# Patient Record
Sex: FEMALE | Race: BLACK OR AFRICAN AMERICAN | Employment: UNEMPLOYED | URBAN - METROPOLITAN AREA
[De-identification: names, ages, dates, MRNs, and addresses within clinical notes are randomized per-mention and may not be internally consistent; named-entity substitution may affect disease eponyms.]

---

## 2019-12-17 ENCOUNTER — APPOINTMENT (OUTPATIENT)
Dept: CT IMAGING | Age: 39
End: 2019-12-17
Payer: COMMERCIAL

## 2019-12-17 ENCOUNTER — HOSPITAL ENCOUNTER (EMERGENCY)
Age: 39
Discharge: HOME | End: 2019-12-17
Attending: EMERGENCY MEDICINE
Payer: COMMERCIAL

## 2019-12-17 ENCOUNTER — APPOINTMENT (OUTPATIENT)
Dept: GENERAL RADIOLOGY | Age: 39
End: 2019-12-17
Payer: COMMERCIAL

## 2019-12-17 VITALS
DIASTOLIC BLOOD PRESSURE: 68 MMHG | TEMPERATURE: 98.1 F | RESPIRATION RATE: 12 BRPM | HEART RATE: 74 BPM | OXYGEN SATURATION: 96 % | WEIGHT: 126 LBS | HEIGHT: 65 IN | BODY MASS INDEX: 20.99 KG/M2 | SYSTOLIC BLOOD PRESSURE: 114 MMHG
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
## 2019-12-17 DIAGNOSIS — S09.90XA CLOSED HEAD INJURY, INITIAL ENCOUNTER: ICD-10-CM

## 2019-12-17 DIAGNOSIS — S60.052A CONTUSION OF LEFT LITTLE FINGER WITHOUT DAMAGE TO NAIL, INITIAL ENCOUNTER: ICD-10-CM

## 2019-12-17 DIAGNOSIS — S16.1XXA STRAIN OF NECK MUSCLE, INITIAL ENCOUNTER: ICD-10-CM

## 2019-12-17 LAB — HCG(URINE) PREGNANCY TEST: NEGATIVE

## 2019-12-17 PROCEDURE — 6370000000 HC RX 637 (ALT 250 FOR IP): Performed by: EMERGENCY MEDICINE

## 2019-12-17 PROCEDURE — 73140 X-RAY EXAM OF FINGER(S): CPT

## 2019-12-17 PROCEDURE — 72125 CT NECK SPINE W/O DYE: CPT

## 2019-12-17 PROCEDURE — 84703 CHORIONIC GONADOTROPIN ASSAY: CPT

## 2019-12-17 PROCEDURE — 99284 EMERGENCY DEPT VISIT MOD MDM: CPT

## 2019-12-17 RX ORDER — NAPROXEN 500 MG/1
500 TABLET ORAL 2 TIMES DAILY
Qty: 20 TABLET | Refills: 0 | Status: SHIPPED | OUTPATIENT
Start: 2019-12-17 | End: 2019-12-27

## 2019-12-17 RX ORDER — METHADONE HYDROCHLORIDE 10 MG/5ML
35 SOLUTION ORAL DAILY
COMMUNITY

## 2019-12-17 RX ORDER — IBUPROFEN 400 MG/1
400 TABLET ORAL ONCE
Status: COMPLETED | OUTPATIENT
Start: 2019-12-17 | End: 2019-12-17

## 2019-12-17 RX ADMIN — IBUPROFEN 400 MG: 400 TABLET, FILM COATED ORAL at 20:19

## 2019-12-17 SDOH — HEALTH STABILITY: MENTAL HEALTH: HOW OFTEN DO YOU HAVE A DRINK CONTAINING ALCOHOL?: NEVER

## 2019-12-17 ASSESSMENT — PAIN SCALES - GENERAL
PAINLEVEL_OUTOF10: 0
PAINLEVEL_OUTOF10: 7
PAINLEVEL_OUTOF10: 7

## 2019-12-17 ASSESSMENT — PAIN DESCRIPTION - LOCATION: LOCATION: NECK

## 2019-12-17 ASSESSMENT — PAIN DESCRIPTION - PAIN TYPE: TYPE: ACUTE PAIN

## 2019-12-17 ASSESSMENT — PAIN DESCRIPTION - ORIENTATION: ORIENTATION: POSTERIOR

## 2019-12-17 ASSESSMENT — PAIN DESCRIPTION - DESCRIPTORS: DESCRIPTORS: SHARP

## 2019-12-17 ASSESSMENT — PAIN DESCRIPTION - FREQUENCY: FREQUENCY: CONTINUOUS

## 2024-08-25 ENCOUNTER — HOSPITAL ENCOUNTER (EMERGENCY)
Age: 44
Discharge: HOME | End: 2024-08-25

## 2024-11-22 ENCOUNTER — APPOINTMENT (OUTPATIENT)
Dept: CT IMAGING | Age: 44
DRG: 309 | End: 2024-11-22
Payer: COMMERCIAL

## 2024-11-22 ENCOUNTER — HOSPITAL ENCOUNTER (INPATIENT)
Age: 44
LOS: 2 days | Discharge: HOME | DRG: 309 | End: 2024-11-25
Attending: EMERGENCY MEDICINE | Admitting: HOSPITALIST
Payer: COMMERCIAL

## 2024-11-22 DIAGNOSIS — E87.6 HYPOKALEMIA: ICD-10-CM

## 2024-11-22 DIAGNOSIS — K59.03 DRUG-INDUCED CONSTIPATION: ICD-10-CM

## 2024-11-22 DIAGNOSIS — R55 SYNCOPE AND COLLAPSE: ICD-10-CM

## 2024-11-22 DIAGNOSIS — T67.1XXD HEAT SYNCOPE, SUBSEQUENT ENCOUNTER: ICD-10-CM

## 2024-11-22 DIAGNOSIS — E83.41 HYPERMAGNESEMIA: ICD-10-CM

## 2024-11-22 DIAGNOSIS — I47.20 VENTRICULAR TACHYCARDIA (HCC): Primary | ICD-10-CM

## 2024-11-22 DIAGNOSIS — K56.41 FECAL IMPACTION IN RECTUM (HCC): ICD-10-CM

## 2024-11-22 LAB
BASOPHILS # BLD: 0 K/UL (ref 0–0.2)
BASOPHILS NFR BLD: 0.2 %
DEPRECATED RDW RBC AUTO: 15.2 % (ref 12.4–15.4)
EOSINOPHIL # BLD: 0 K/UL (ref 0–0.6)
EOSINOPHIL NFR BLD: 0 %
HCT VFR BLD AUTO: 42.1 % (ref 36–48)
HGB BLD-MCNC: 14.2 G/DL (ref 12–16)
LYMPHOCYTES # BLD: 1.6 K/UL (ref 1–5.1)
LYMPHOCYTES NFR BLD: 6.9 %
MCH RBC QN AUTO: 36.3 PG (ref 26–34)
MCHC RBC AUTO-ENTMCNC: 33.8 G/DL (ref 31–36)
MCV RBC AUTO: 107.5 FL (ref 80–100)
MONOCYTES # BLD: 1.2 K/UL (ref 0–1.3)
MONOCYTES NFR BLD: 5.1 %
NEUTROPHILS # BLD: 20.3 K/UL (ref 1.7–7.7)
NEUTROPHILS NFR BLD: 87.8 %
PLATELET # BLD AUTO: 365 K/UL (ref 135–450)
PMV BLD AUTO: 8.2 FL (ref 5–10.5)
RBC # BLD AUTO: 3.92 M/UL (ref 4–5.2)
WBC # BLD AUTO: 23.1 K/UL (ref 4–11)

## 2024-11-22 PROCEDURE — 36415 COLL VENOUS BLD VENIPUNCTURE: CPT

## 2024-11-22 PROCEDURE — 80048 BASIC METABOLIC PNL TOTAL CA: CPT

## 2024-11-22 PROCEDURE — 93005 ELECTROCARDIOGRAM TRACING: CPT | Performed by: EMERGENCY MEDICINE

## 2024-11-22 PROCEDURE — 83880 ASSAY OF NATRIURETIC PEPTIDE: CPT

## 2024-11-22 PROCEDURE — 84484 ASSAY OF TROPONIN QUANT: CPT

## 2024-11-22 PROCEDURE — 80076 HEPATIC FUNCTION PANEL: CPT

## 2024-11-22 PROCEDURE — 85025 COMPLETE CBC W/AUTO DIFF WBC: CPT

## 2024-11-22 PROCEDURE — 99285 EMERGENCY DEPT VISIT HI MDM: CPT

## 2024-11-22 PROCEDURE — 83605 ASSAY OF LACTIC ACID: CPT

## 2024-11-22 PROCEDURE — 83735 ASSAY OF MAGNESIUM: CPT

## 2024-11-22 ASSESSMENT — LIFESTYLE VARIABLES
HOW MANY STANDARD DRINKS CONTAINING ALCOHOL DO YOU HAVE ON A TYPICAL DAY: PATIENT DOES NOT DRINK
HOW OFTEN DO YOU HAVE A DRINK CONTAINING ALCOHOL: NEVER

## 2024-11-23 ENCOUNTER — APPOINTMENT (OUTPATIENT)
Dept: CT IMAGING | Age: 44
DRG: 309 | End: 2024-11-23
Payer: COMMERCIAL

## 2024-11-23 ENCOUNTER — APPOINTMENT (OUTPATIENT)
Dept: GENERAL RADIOLOGY | Age: 44
DRG: 309 | End: 2024-11-23
Payer: COMMERCIAL

## 2024-11-23 ENCOUNTER — APPOINTMENT (OUTPATIENT)
Age: 44
DRG: 309 | End: 2024-11-23
Attending: HOSPITALIST
Payer: COMMERCIAL

## 2024-11-23 PROBLEM — K59.03 OPIOID-INDUCED CONSTIPATION: Status: ACTIVE | Noted: 2024-11-23

## 2024-11-23 PROBLEM — D72.829 LEUKOCYTOSIS: Status: ACTIVE | Noted: 2024-11-23

## 2024-11-23 PROBLEM — K59.00 CONSTIPATION: Status: ACTIVE | Noted: 2024-11-23

## 2024-11-23 PROBLEM — F11.90 METHADONE USE: Status: ACTIVE | Noted: 2024-11-23

## 2024-11-23 PROBLEM — R55 SYNCOPE AND COLLAPSE: Status: ACTIVE | Noted: 2024-11-23

## 2024-11-23 PROBLEM — Z72.0 TOBACCO ABUSE: Status: ACTIVE | Noted: 2024-11-23

## 2024-11-23 PROBLEM — T40.2X5A OPIOID-INDUCED CONSTIPATION: Status: ACTIVE | Noted: 2024-11-23

## 2024-11-23 PROBLEM — E87.6 HYPOKALEMIA: Status: ACTIVE | Noted: 2024-11-23

## 2024-11-23 PROBLEM — E87.20 LACTIC ACIDOSIS: Status: ACTIVE | Noted: 2024-11-23

## 2024-11-23 PROBLEM — I46.9 CARDIAC ARREST: Status: ACTIVE | Noted: 2024-11-23

## 2024-11-23 PROBLEM — K52.89 STERCORAL COLITIS: Status: ACTIVE | Noted: 2024-11-23

## 2024-11-23 PROBLEM — I47.20 VENTRICULAR TACHYCARDIA (HCC): Status: ACTIVE | Noted: 2024-11-23

## 2024-11-23 LAB
ALBUMIN SERPL-MCNC: 4.1 G/DL (ref 3.4–5)
ALP SERPL-CCNC: 68 U/L (ref 40–129)
ALT SERPL-CCNC: <5 U/L (ref 10–40)
ANION GAP SERPL CALCULATED.3IONS-SCNC: 14 MMOL/L (ref 3–16)
ANION GAP SERPL CALCULATED.3IONS-SCNC: 17 MMOL/L (ref 3–16)
ANION GAP SERPL CALCULATED.3IONS-SCNC: 22 MMOL/L (ref 3–16)
AST SERPL-CCNC: 28 U/L (ref 15–37)
BASOPHILS # BLD: 0.1 K/UL (ref 0–0.2)
BASOPHILS NFR BLD: 0.3 %
BILIRUB DIRECT SERPL-MCNC: 0.2 MG/DL (ref 0–0.3)
BILIRUB INDIRECT SERPL-MCNC: 0.3 MG/DL (ref 0–1)
BILIRUB SERPL-MCNC: 0.5 MG/DL (ref 0–1)
BUN SERPL-MCNC: 8 MG/DL (ref 7–20)
BUN SERPL-MCNC: 8 MG/DL (ref 7–20)
BUN SERPL-MCNC: 9 MG/DL (ref 7–20)
CALCIUM SERPL-MCNC: 8.7 MG/DL (ref 8.3–10.6)
CALCIUM SERPL-MCNC: 8.9 MG/DL (ref 8.3–10.6)
CALCIUM SERPL-MCNC: 9.7 MG/DL (ref 8.3–10.6)
CHLORIDE SERPL-SCNC: 103 MMOL/L (ref 99–110)
CHLORIDE SERPL-SCNC: 105 MMOL/L (ref 99–110)
CHLORIDE SERPL-SCNC: 109 MMOL/L (ref 99–110)
CO2 SERPL-SCNC: 20 MMOL/L (ref 21–32)
CO2 SERPL-SCNC: 22 MMOL/L (ref 21–32)
CO2 SERPL-SCNC: 22 MMOL/L (ref 21–32)
CREAT SERPL-MCNC: 1 MG/DL (ref 0.6–1.1)
CREAT SERPL-MCNC: 1 MG/DL (ref 0.6–1.1)
CREAT SERPL-MCNC: 1.1 MG/DL (ref 0.6–1.1)
DEPRECATED RDW RBC AUTO: 14.8 % (ref 12.4–15.4)
ECHO AO ASC DIAM: 2.5 CM
ECHO AO ASCENDING AORTA INDEX: 1.62 CM/M2
ECHO AO ROOT DIAM: 2.8 CM
ECHO AO ROOT INDEX: 1.82 CM/M2
ECHO AV AREA PEAK VELOCITY: 2.2 CM2
ECHO AV AREA VTI: 2.9 CM2
ECHO AV AREA/BSA PEAK VELOCITY: 1.4 CM2/M2
ECHO AV AREA/BSA VTI: 1.9 CM2/M2
ECHO AV MEAN GRADIENT: 3 MMHG
ECHO AV MEAN VELOCITY: 0.8 M/S
ECHO AV PEAK GRADIENT: 7 MMHG
ECHO AV PEAK VELOCITY: 1.3 M/S
ECHO AV VELOCITY RATIO: 0.77
ECHO AV VTI: 25.5 CM
ECHO BSA: 1.52 M2
ECHO EST RA PRESSURE: 3 MMHG
ECHO LA AREA 2C: 10.3 CM2
ECHO LA AREA 4C: 10.5 CM2
ECHO LA MAJOR AXIS: 3.7 CM
ECHO LA MINOR AXIS: 3.1 CM
ECHO LA VOL BP: 27 ML (ref 22–52)
ECHO LA VOL MOD A2C: 28 ML (ref 22–52)
ECHO LA VOL MOD A4C: 23 ML (ref 22–52)
ECHO LA VOL/BSA BIPLANE: 18 ML/M2 (ref 16–34)
ECHO LA VOLUME INDEX MOD A2C: 18 ML/M2 (ref 16–34)
ECHO LA VOLUME INDEX MOD A4C: 15 ML/M2 (ref 16–34)
ECHO LV EDV A2C: 46 ML
ECHO LV EDV A4C: 49 ML
ECHO LV EDV INDEX A4C: 32 ML/M2
ECHO LV EDV NDEX A2C: 30 ML/M2
ECHO LV EJECTION FRACTION A2C: 65 %
ECHO LV EJECTION FRACTION A4C: 61 %
ECHO LV EJECTION FRACTION BIPLANE: 63 % (ref 55–100)
ECHO LV ESV A2C: 16 ML
ECHO LV ESV A4C: 19 ML
ECHO LV ESV INDEX A2C: 10 ML/M2
ECHO LV ESV INDEX A4C: 12 ML/M2
ECHO LVOT AREA: 3.1 CM2
ECHO LVOT AV VTI INDEX: 0.97
ECHO LVOT DIAM: 2 CM
ECHO LVOT MEAN GRADIENT: 2 MMHG
ECHO LVOT PEAK GRADIENT: 4 MMHG
ECHO LVOT PEAK VELOCITY: 1 M/S
ECHO LVOT STROKE VOLUME INDEX: 50.4 ML/M2
ECHO LVOT SV: 77.6 ML
ECHO LVOT VTI: 24.7 CM
ECHO MV A VELOCITY: 0.8 M/S
ECHO MV AREA VTI: 2.6 CM2
ECHO MV E DECELERATION TIME (DT): 214 MS
ECHO MV E VELOCITY: 0.92 M/S
ECHO MV E/A RATIO: 1.15
ECHO MV LVOT VTI INDEX: 1.21
ECHO MV MAX VELOCITY: 1.1 M/S
ECHO MV MEAN GRADIENT: 2 MMHG
ECHO MV MEAN VELOCITY: 0.6 M/S
ECHO MV PEAK GRADIENT: 5 MMHG
ECHO MV VTI: 30 CM
ECHO PV MAX VELOCITY: 1.1 M/S
ECHO PV PEAK GRADIENT: 5 MMHG
ECHO RIGHT VENTRICULAR SYSTOLIC PRESSURE (RVSP): 6 MMHG
ECHO RV FREE WALL PEAK S': 12 CM/S
ECHO RV TAPSE: 2.6 CM (ref 1.7–?)
ECHO TV REGURGITANT MAX VELOCITY: 0.92 M/S
ECHO TV REGURGITANT PEAK GRADIENT: 3 MMHG
EKG ATRIAL RATE: 153 BPM
EKG ATRIAL RATE: 64 BPM
EKG ATRIAL RATE: 72 BPM
EKG DIAGNOSIS: NORMAL
EKG P AXIS: 84 DEGREES
EKG P AXIS: 98 DEGREES
EKG P-R INTERVAL: 104 MS
EKG P-R INTERVAL: 116 MS
EKG Q-T INTERVAL: 362 MS
EKG Q-T INTERVAL: 480 MS
EKG Q-T INTERVAL: 676 MS
EKG QRS DURATION: 158 MS
EKG QRS DURATION: 62 MS
EKG QRS DURATION: 80 MS
EKG QTC CALCULATION (BAZETT): 494 MS
EKG QTC CALCULATION (BAZETT): 525 MS
EKG QTC CALCULATION (BAZETT): 697 MS
EKG R AXIS: 264 DEGREES
EKG R AXIS: 86 DEGREES
EKG R AXIS: 90 DEGREES
EKG T AXIS: 102 DEGREES
EKG T AXIS: 151 DEGREES
EKG T AXIS: 78 DEGREES
EKG VENTRICULAR RATE: 112 BPM
EKG VENTRICULAR RATE: 64 BPM
EKG VENTRICULAR RATE: 72 BPM
EOSINOPHIL # BLD: 0 K/UL (ref 0–0.6)
EOSINOPHIL NFR BLD: 0 %
GFR SERPLBLD CREATININE-BSD FMLA CKD-EPI: 63 ML/MIN/{1.73_M2}
GFR SERPLBLD CREATININE-BSD FMLA CKD-EPI: 71 ML/MIN/{1.73_M2}
GFR SERPLBLD CREATININE-BSD FMLA CKD-EPI: 71 ML/MIN/{1.73_M2}
GLUCOSE SERPL-MCNC: 158 MG/DL (ref 70–99)
GLUCOSE SERPL-MCNC: 92 MG/DL (ref 70–99)
GLUCOSE SERPL-MCNC: 97 MG/DL (ref 70–99)
HCG SERPL QL: NEGATIVE
HCT VFR BLD AUTO: 39.1 % (ref 36–48)
HGB BLD-MCNC: 13.2 G/DL (ref 12–16)
LACTATE BLDV-SCNC: 2.2 MMOL/L (ref 0.4–2)
LYMPHOCYTES # BLD: 0.8 K/UL (ref 1–5.1)
LYMPHOCYTES NFR BLD: 4.3 %
MAGNESIUM SERPL-MCNC: 2.06 MG/DL (ref 1.8–2.4)
MAGNESIUM SERPL-MCNC: 2.5 MG/DL (ref 1.8–2.4)
MCH RBC QN AUTO: 35.9 PG (ref 26–34)
MCHC RBC AUTO-ENTMCNC: 33.8 G/DL (ref 31–36)
MCV RBC AUTO: 106.1 FL (ref 80–100)
MONOCYTES # BLD: 0.8 K/UL (ref 0–1.3)
MONOCYTES NFR BLD: 4.2 %
NEUTROPHILS # BLD: 17.4 K/UL (ref 1.7–7.7)
NEUTROPHILS NFR BLD: 91.2 %
NT-PROBNP SERPL-MCNC: 927 PG/ML (ref 0–124)
PHOSPHATE SERPL-MCNC: 1.9 MG/DL (ref 2.5–4.9)
PLATELET # BLD AUTO: 321 K/UL (ref 135–450)
PMV BLD AUTO: 8.7 FL (ref 5–10.5)
POTASSIUM SERPL-SCNC: 2.5 MMOL/L (ref 3.5–5.1)
POTASSIUM SERPL-SCNC: 3.2 MMOL/L (ref 3.5–5.1)
POTASSIUM SERPL-SCNC: 3.4 MMOL/L (ref 3.5–5.1)
PROT SERPL-MCNC: 7.7 G/DL (ref 6.4–8.2)
RBC # BLD AUTO: 3.68 M/UL (ref 4–5.2)
SODIUM SERPL-SCNC: 141 MMOL/L (ref 136–145)
SODIUM SERPL-SCNC: 145 MMOL/L (ref 136–145)
SODIUM SERPL-SCNC: 148 MMOL/L (ref 136–145)
TROPONIN, HIGH SENSITIVITY: 27 NG/L (ref 0–14)
TROPONIN, HIGH SENSITIVITY: 47 NG/L (ref 0–14)
WBC # BLD AUTO: 19 K/UL (ref 4–11)

## 2024-11-23 PROCEDURE — 93306 TTE W/DOPPLER COMPLETE: CPT | Performed by: INTERNAL MEDICINE

## 2024-11-23 PROCEDURE — 99291 CRITICAL CARE FIRST HOUR: CPT | Performed by: INTERNAL MEDICINE

## 2024-11-23 PROCEDURE — 93306 TTE W/DOPPLER COMPLETE: CPT

## 2024-11-23 PROCEDURE — 84100 ASSAY OF PHOSPHORUS: CPT

## 2024-11-23 PROCEDURE — 6360000002 HC RX W HCPCS: Performed by: STUDENT IN AN ORGANIZED HEALTH CARE EDUCATION/TRAINING PROGRAM

## 2024-11-23 PROCEDURE — 2580000003 HC RX 258: Performed by: EMERGENCY MEDICINE

## 2024-11-23 PROCEDURE — 6370000000 HC RX 637 (ALT 250 FOR IP): Performed by: HOSPITALIST

## 2024-11-23 PROCEDURE — 71260 CT THORAX DX C+: CPT

## 2024-11-23 PROCEDURE — 6360000002 HC RX W HCPCS: Performed by: HOSPITALIST

## 2024-11-23 PROCEDURE — 93005 ELECTROCARDIOGRAM TRACING: CPT | Performed by: EMERGENCY MEDICINE

## 2024-11-23 PROCEDURE — 93010 ELECTROCARDIOGRAM REPORT: CPT | Performed by: INTERNAL MEDICINE

## 2024-11-23 PROCEDURE — 2580000003 HC RX 258: Performed by: HOSPITALIST

## 2024-11-23 PROCEDURE — 2580000003 HC RX 258: Performed by: STUDENT IN AN ORGANIZED HEALTH CARE EDUCATION/TRAINING PROGRAM

## 2024-11-23 PROCEDURE — 85025 COMPLETE CBC W/AUTO DIFF WBC: CPT

## 2024-11-23 PROCEDURE — 5A12012 PERFORMANCE OF CARDIAC OUTPUT, SINGLE, MANUAL: ICD-10-PCS | Performed by: HOSPITALIST

## 2024-11-23 PROCEDURE — 96374 THER/PROPH/DIAG INJ IV PUSH: CPT

## 2024-11-23 PROCEDURE — 2000000000 HC ICU R&B

## 2024-11-23 PROCEDURE — 80048 BASIC METABOLIC PNL TOTAL CA: CPT

## 2024-11-23 PROCEDURE — 36415 COLL VENOUS BLD VENIPUNCTURE: CPT

## 2024-11-23 PROCEDURE — 6370000000 HC RX 637 (ALT 250 FOR IP): Performed by: INTERNAL MEDICINE

## 2024-11-23 PROCEDURE — 84484 ASSAY OF TROPONIN QUANT: CPT

## 2024-11-23 PROCEDURE — 94760 N-INVAS EAR/PLS OXIMETRY 1: CPT

## 2024-11-23 PROCEDURE — 02HV33Z INSERTION OF INFUSION DEVICE INTO SUPERIOR VENA CAVA, PERCUTANEOUS APPROACH: ICD-10-PCS | Performed by: HOSPITALIST

## 2024-11-23 PROCEDURE — 6360000002 HC RX W HCPCS: Performed by: EMERGENCY MEDICINE

## 2024-11-23 PROCEDURE — 83735 ASSAY OF MAGNESIUM: CPT

## 2024-11-23 PROCEDURE — 99223 1ST HOSP IP/OBS HIGH 75: CPT | Performed by: INTERNAL MEDICINE

## 2024-11-23 PROCEDURE — 71045 X-RAY EXAM CHEST 1 VIEW: CPT

## 2024-11-23 PROCEDURE — 84703 CHORIONIC GONADOTROPIN ASSAY: CPT

## 2024-11-23 PROCEDURE — 74177 CT ABD & PELVIS W/CONTRAST: CPT

## 2024-11-23 PROCEDURE — 6360000004 HC RX CONTRAST MEDICATION: Performed by: EMERGENCY MEDICINE

## 2024-11-23 PROCEDURE — 36592 COLLECT BLOOD FROM PICC: CPT

## 2024-11-23 PROCEDURE — 2500000003 HC RX 250 WO HCPCS: Performed by: STUDENT IN AN ORGANIZED HEALTH CARE EDUCATION/TRAINING PROGRAM

## 2024-11-23 RX ORDER — ONDANSETRON 4 MG/1
4 TABLET, ORALLY DISINTEGRATING ORAL EVERY 8 HOURS PRN
Status: CANCELLED | OUTPATIENT
Start: 2024-11-23

## 2024-11-23 RX ORDER — POTASSIUM CHLORIDE 29.8 MG/ML
20 INJECTION INTRAVENOUS PRN
Status: DISCONTINUED | OUTPATIENT
Start: 2024-11-23 | End: 2024-11-23 | Stop reason: SDUPTHER

## 2024-11-23 RX ORDER — LIDOCAINE HYDROCHLORIDE 10 MG/ML
50 INJECTION, SOLUTION EPIDURAL; INFILTRATION; INTRACAUDAL; PERINEURAL ONCE
Status: DISCONTINUED | OUTPATIENT
Start: 2024-11-23 | End: 2024-11-25 | Stop reason: HOSPADM

## 2024-11-23 RX ORDER — ACETAMINOPHEN 325 MG/1
650 TABLET ORAL EVERY 6 HOURS PRN
Status: DISCONTINUED | OUTPATIENT
Start: 2024-11-23 | End: 2024-11-25 | Stop reason: HOSPADM

## 2024-11-23 RX ORDER — SODIUM CHLORIDE 9 MG/ML
INJECTION, SOLUTION INTRAVENOUS PRN
Status: DISCONTINUED | OUTPATIENT
Start: 2024-11-23 | End: 2024-11-25 | Stop reason: HOSPADM

## 2024-11-23 RX ORDER — POTASSIUM CHLORIDE 29.8 MG/ML
20 INJECTION INTRAVENOUS PRN
Status: DISCONTINUED | OUTPATIENT
Start: 2024-11-23 | End: 2024-11-25 | Stop reason: HOSPADM

## 2024-11-23 RX ORDER — POTASSIUM CHLORIDE 7.45 MG/ML
10 INJECTION INTRAVENOUS
Status: DISPENSED | OUTPATIENT
Start: 2024-11-23 | End: 2024-11-23

## 2024-11-23 RX ORDER — METHADONE HYDROCHLORIDE 10 MG/1
80 TABLET ORAL DAILY
Status: DISCONTINUED | OUTPATIENT
Start: 2024-11-24 | End: 2024-11-24

## 2024-11-23 RX ORDER — SODIUM CHLORIDE 9 MG/ML
INJECTION, SOLUTION INTRAVENOUS PRN
Status: DISCONTINUED | OUTPATIENT
Start: 2024-11-23 | End: 2024-11-23 | Stop reason: SDUPTHER

## 2024-11-23 RX ORDER — POTASSIUM CHLORIDE 7.45 MG/ML
10 INJECTION INTRAVENOUS PRN
Status: DISCONTINUED | OUTPATIENT
Start: 2024-11-23 | End: 2024-11-25 | Stop reason: HOSPADM

## 2024-11-23 RX ORDER — PROCHLORPERAZINE EDISYLATE 5 MG/ML
10 INJECTION INTRAMUSCULAR; INTRAVENOUS EVERY 6 HOURS PRN
Status: DISCONTINUED | OUTPATIENT
Start: 2024-11-23 | End: 2024-11-25 | Stop reason: HOSPADM

## 2024-11-23 RX ORDER — POTASSIUM CHLORIDE 1500 MG/1
40 TABLET, EXTENDED RELEASE ORAL ONCE
Status: COMPLETED | OUTPATIENT
Start: 2024-11-23 | End: 2024-11-23

## 2024-11-23 RX ORDER — POTASSIUM CHLORIDE 7.45 MG/ML
10 INJECTION INTRAVENOUS PRN
Status: DISCONTINUED | OUTPATIENT
Start: 2024-11-23 | End: 2024-11-23 | Stop reason: SDUPTHER

## 2024-11-23 RX ORDER — MAGNESIUM SULFATE IN WATER 40 MG/ML
2000 INJECTION, SOLUTION INTRAVENOUS PRN
Status: DISCONTINUED | OUTPATIENT
Start: 2024-11-23 | End: 2024-11-25 | Stop reason: HOSPADM

## 2024-11-23 RX ORDER — LIDOCAINE HYDROCHLORIDE ANHYDROUS AND DEXTROSE MONOHYDRATE 5; 400 G/100ML; MG/100ML
1 INJECTION, SOLUTION INTRAVENOUS CONTINUOUS
Status: DISCONTINUED | OUTPATIENT
Start: 2024-11-23 | End: 2024-11-25 | Stop reason: HOSPADM

## 2024-11-23 RX ORDER — ENOXAPARIN SODIUM 100 MG/ML
40 INJECTION SUBCUTANEOUS DAILY
Status: CANCELLED | OUTPATIENT
Start: 2024-11-23

## 2024-11-23 RX ORDER — SODIUM CHLORIDE 0.9 % (FLUSH) 0.9 %
5-40 SYRINGE (ML) INJECTION EVERY 12 HOURS SCHEDULED
Status: DISCONTINUED | OUTPATIENT
Start: 2024-11-23 | End: 2024-11-25 | Stop reason: HOSPADM

## 2024-11-23 RX ORDER — SODIUM CHLORIDE 0.9 % (FLUSH) 0.9 %
5-40 SYRINGE (ML) INJECTION PRN
Status: DISCONTINUED | OUTPATIENT
Start: 2024-11-23 | End: 2024-11-23 | Stop reason: SDUPTHER

## 2024-11-23 RX ORDER — ONDANSETRON 2 MG/ML
4 INJECTION INTRAMUSCULAR; INTRAVENOUS EVERY 6 HOURS PRN
Status: CANCELLED | OUTPATIENT
Start: 2024-11-23

## 2024-11-23 RX ORDER — SODIUM CHLORIDE 0.9 % (FLUSH) 0.9 %
5-40 SYRINGE (ML) INJECTION PRN
Status: DISCONTINUED | OUTPATIENT
Start: 2024-11-23 | End: 2024-11-25 | Stop reason: HOSPADM

## 2024-11-23 RX ORDER — SODIUM CHLORIDE 0.9 % (FLUSH) 0.9 %
5-40 SYRINGE (ML) INJECTION EVERY 12 HOURS SCHEDULED
Status: DISCONTINUED | OUTPATIENT
Start: 2024-11-23 | End: 2024-11-23 | Stop reason: SDUPTHER

## 2024-11-23 RX ORDER — IOPAMIDOL 755 MG/ML
75 INJECTION, SOLUTION INTRAVASCULAR
Status: COMPLETED | OUTPATIENT
Start: 2024-11-23 | End: 2024-11-23

## 2024-11-23 RX ORDER — NICOTINE 21 MG/24HR
1 PATCH, TRANSDERMAL 24 HOURS TRANSDERMAL DAILY
Status: DISCONTINUED | OUTPATIENT
Start: 2024-11-23 | End: 2024-11-25

## 2024-11-23 RX ORDER — ACETAMINOPHEN 650 MG/1
650 SUPPOSITORY RECTAL EVERY 6 HOURS PRN
Status: DISCONTINUED | OUTPATIENT
Start: 2024-11-23 | End: 2024-11-25 | Stop reason: HOSPADM

## 2024-11-23 RX ORDER — POLYETHYLENE GLYCOL 3350 17 G/17G
17 POWDER, FOR SOLUTION ORAL DAILY PRN
Status: DISCONTINUED | OUTPATIENT
Start: 2024-11-23 | End: 2024-11-25 | Stop reason: HOSPADM

## 2024-11-23 RX ADMIN — PIPERACILLIN AND TAZOBACTAM 3375 MG: 3; .375 INJECTION, POWDER, LYOPHILIZED, FOR SOLUTION INTRAVENOUS at 18:35

## 2024-11-23 RX ADMIN — AMIODARONE HYDROCHLORIDE 0.5 MG/MIN: 50 INJECTION, SOLUTION INTRAVENOUS at 14:31

## 2024-11-23 RX ADMIN — POTASSIUM CHLORIDE 40 MEQ: 1500 TABLET, EXTENDED RELEASE ORAL at 05:17

## 2024-11-23 RX ADMIN — PIPERACILLIN AND TAZOBACTAM 4500 MG: 4; .5 INJECTION, POWDER, FOR SOLUTION INTRAVENOUS at 05:11

## 2024-11-23 RX ADMIN — METHYLNALTREXONE BROMIDE 12 MG: 12 INJECTION, SOLUTION SUBCUTANEOUS at 05:59

## 2024-11-23 RX ADMIN — SODIUM CHLORIDE, PRESERVATIVE FREE 10 ML: 5 INJECTION INTRAVENOUS at 10:48

## 2024-11-23 RX ADMIN — SODIUM CHLORIDE, PRESERVATIVE FREE 10 ML: 5 INJECTION INTRAVENOUS at 21:45

## 2024-11-23 RX ADMIN — POTASSIUM CHLORIDE 10 MEQ: 7.46 INJECTION, SOLUTION INTRAVENOUS at 05:07

## 2024-11-23 RX ADMIN — POLYETHYLENE GLYCOL-3350 AND ELECTROLYTES 4000 ML: 236; 6.74; 5.86; 2.97; 22.74 POWDER, FOR SOLUTION ORAL at 10:58

## 2024-11-23 RX ADMIN — POTASSIUM CHLORIDE 20 MEQ: 29.8 INJECTION, SOLUTION INTRAVENOUS at 21:44

## 2024-11-23 RX ADMIN — POTASSIUM CHLORIDE 20 MEQ: 29.8 INJECTION, SOLUTION INTRAVENOUS at 13:38

## 2024-11-23 RX ADMIN — LIDOCAINE HYDROCHLORIDE 1 MG/MIN: 4 INJECTION, SOLUTION INTRAVENOUS at 04:16

## 2024-11-23 RX ADMIN — PROCHLORPERAZINE EDISYLATE 10 MG: 5 INJECTION INTRAMUSCULAR; INTRAVENOUS at 20:58

## 2024-11-23 RX ADMIN — POTASSIUM CHLORIDE 20 MEQ: 29.8 INJECTION, SOLUTION INTRAVENOUS at 15:22

## 2024-11-23 RX ADMIN — AMIODARONE HYDROCHLORIDE 150 MG: 50 INJECTION, SOLUTION INTRAVENOUS at 00:25

## 2024-11-23 RX ADMIN — POTASSIUM CHLORIDE 20 MEQ: 29.8 INJECTION, SOLUTION INTRAVENOUS at 10:50

## 2024-11-23 RX ADMIN — POTASSIUM PHOSPHATE, MONOBASIC POTASSIUM PHOSPHATE, DIBASIC 15 MMOL: 224; 236 INJECTION, SOLUTION, CONCENTRATE INTRAVENOUS at 16:25

## 2024-11-23 RX ADMIN — POTASSIUM CHLORIDE 20 MEQ: 29.8 INJECTION, SOLUTION INTRAVENOUS at 20:30

## 2024-11-23 RX ADMIN — AMIODARONE HYDROCHLORIDE 0.5 MG/MIN: 50 INJECTION, SOLUTION INTRAVENOUS at 00:32

## 2024-11-23 RX ADMIN — IOPAMIDOL 75 ML: 755 INJECTION, SOLUTION INTRAVENOUS at 03:21

## 2024-11-23 RX ADMIN — PIPERACILLIN AND TAZOBACTAM 3375 MG: 3; .375 INJECTION, POWDER, LYOPHILIZED, FOR SOLUTION INTRAVENOUS at 10:58

## 2024-11-23 RX ADMIN — PROCHLORPERAZINE EDISYLATE 10 MG: 5 INJECTION INTRAMUSCULAR; INTRAVENOUS at 06:11

## 2024-11-23 ASSESSMENT — PAIN DESCRIPTION - LOCATION: LOCATION: HEAD

## 2024-11-23 ASSESSMENT — PAIN DESCRIPTION - ORIENTATION: ORIENTATION: MID

## 2024-11-23 ASSESSMENT — PAIN SCALES - GENERAL
PAINLEVEL_OUTOF10: 0
PAINLEVEL_OUTOF10: 3

## 2024-11-23 ASSESSMENT — PAIN DESCRIPTION - DESCRIPTORS: DESCRIPTORS: ACHING

## 2024-11-23 NOTE — PROGRESS NOTES
4 Eyes Skin Assessment     NAME:  Lara Jacome  YOB: 1980  MEDICAL RECORD NUMBER:  [id number]    The patient is being assessed for  Admission    I agree that at least one RN has performed a thorough Head to Toe Skin Assessment on the patient. ALL assessment sites listed below have been assessed.      Areas assessed by both nurses:    Head, Face, Ears, Shoulders, Back, Chest, Arms, Elbows, Hands, Sacrum. Buttock, Coccyx, Ischium, Legs. Feet and Heels, and Under Medical Devices         Does the Patient have a Wound? No noted wound(s)       Irvin Prevention initiated by RN: No  Wound Care Orders initiated by RN: No    Pressure Injury (Stage 3,4, Unstageable, DTI, NWPT, and Complex wounds) if present, place Wound referral order by RN under : No    New Ostomies, if present place, Ostomy referral order under : No     Nurse 1 eSignature: Electronically signed by Elenita Michele RN on 11/23/24 at 5:03 AM EST    **SHARE this note so that the co-signing nurse can place an eSignature**    Nurse 2 eSignature: Electronically signed by Naomi Bourgeois RN on 11/23/24 at 6:01 AM EST

## 2024-11-23 NOTE — CONSULTS
Cardiac Electrophysiology Consultation   Date: 11/23/2024  Admit Date:  11/22/2024  Reason for Consultation: VT  Consult Requesting Physician: Pk Hewitt DO     Chief Complaint   Patient presents with    Loss of Consciousness    Fecal Impaction     HPI: Lara Jacome is a 44 y.o. female with past medical history of constipation and methadone use who presented with pulseless VT requiring resuscitation in the setting of bearing down in the bathroom.  On day of presentation the patient had multiple episodes of syncope.  She had bowel movement attempt and had episodes of passing out.  Her mother did CPR at home.  She was taken to [facility] where she lost her pulse again and CPR to be done.  She was placed on amiodarone and transferred to [facility].    On arrival EKG showed very prolonged QTc.  EKG shows salvos of VT.  Magnesium was normal but was hypokalemic with a potassium of 3.4.    Overnight the patient had episodes of VT for which lidocaine was started.  She has not had recurrent episodes and has PVCs on the monitor.    EP consulted for this follow-up.      Allergies   Allergen Reactions    Flagyl [Metronidazole] Rash       Social History:  Reviewed.  reports that she has been smoking cigarettes. She started smoking about 10 months ago. She has a 0.9 pack-year smoking history. She has never used smokeless tobacco. She reports that she does not currently use drugs after having used the following drugs: IV and Other-see comments. She reports that she does not drink alcohol.     Family History:  Reviewed. family history is not on file.   No premature CAD.     Review of System:  Pertinent positives and negatives are mentioned in the HPI.  The rest of the systems are negative.    Physical Examination:  Vitals:    11/23/24 0600   BP: 124/69   Pulse: 61   Resp: 18   Temp:    SpO2: 100%        Intake/Output Summary (Last 24 hours) at 11/23/2024 0811  Last data filed at 11/23/2024 0604  Gross per

## 2024-11-23 NOTE — CONSULTS
GASTROENTEROLOGY INPATIENT CONSULTATION      IDENTIFYING DATA/REASON FOR CONSULTATION   PATIENT:  Lara Jacome  MRN:  [id number]  ADMIT DATE: 11/22/2024  TIME OF EVALUATION: 11/23/2024 6:17 AM  HOSPITAL STAY:   LOS: 0 days     REASON FOR CONSULTATION: Constipation    HISTORY OF PRESENT ILLNESS   Lara Jacome is a 44 y.o. female who has a past history notable for opioid abuse on methadone, tobacco abuse who presented to [facility] 11/22/2024 following syncope versus cardiac arrest at home.  We have been consulted regarding constipation.  Patient reportedly had a shaking episode while attempting to have a bowel movement, and had a syncopal episode.  The patient reportedly lost her pulse and CPR was administered by family however upon EMS arrival the patient was alert.  Patient was admitted to the ICU.  Patient seen with sister at bedside.    Patient reports her last bowel movement was over 1 month ago and she has struggled with chronic constipation.  She takes MiraLAX 17 g daily and denies any prior GI evaluation.  She notes intermittently seeing bright red blood per rectum with wiping after producing hard formed stool.  Per ICU nurse, she has had multiple bowel movements described as Gove V brown stools with some blood and mucus.    PAST MEDICAL, SURGICAL, FAMILY, and SOCIAL HISTORY   History reviewed. No pertinent past medical history.  History reviewed. No pertinent surgical history.  History reviewed. No pertinent family history.  Social History     Socioeconomic History    Marital status: Single     Spouse name: None    Number of children: None    Years of education: None    Highest education level: None   Tobacco Use    Smoking status: Every Day     Current packs/day: 1.00     Average packs/day: 1 pack/day for 0.9 years (0.9 ttl pk-yrs)     Types: Cigarettes     Start date: 2024    Smokeless tobacco: Never   Substance and Sexual Activity    Alcohol use: Never    Drug use: Not Currently     Types: IV,  evidence of pulmonary embolism or acute pulmonary abnormality.   2. Large stool burden throughout the colon with large amount of rectal stool   burden in the distal sigmoid and rectum with induration of the perirectal   fat. Findings are compatible with stercoral colitis and fecal impaction.   3. Chronic biliary enlargement without evidence for obstructing mass or stone.         XR CHEST PORTABLE   Final Result   No acute cardiopulmonary abnormality.              ASSESSMENT AND RECOMMENDATIONS   Lara Jacome is a 44 y.o. female who has a past history notable for opioid abuse on methadone, tobacco abuse who presented to [facility] 11/22/2024 following syncope versus cardiac arrest at home.    IMPRESSION:    Opioid-induced constipation: Patient with constipation with large stool burden on CT with methadone use, consistent with opioid-induced constipation.  Patient will benefit from maintenance therapy with Movantik versus Linzess.  In the acute hospital setting, would recommend Relistor as ordered, as well as GoLytely bowel preparation to relieve stool burden and a tapwater enema.  Fecal impaction with presumed stercoral colitis: 2/2 OIC.  Recommend tapwater enema and bowel preparation per above.  Patient currently on Zosyn, would continue antibiotics for presumed stercoral colitis  Syncope versus cardiac arrest: Will defer management to cardiology and critical care team    RECOMMENDATIONS:    -Relistor  -GoLytely bowel preparation  -Tapwater enemas  -Antibiotics for stercoral colitis  -Outpatient colonoscopy    If you have any questions or need any further information, please feel free to contact our consult team.  Thank you for allowing us to participate in the care of Lara Jacome.    Manfred Capone MD  [facility]

## 2024-11-23 NOTE — PROGRESS NOTES
Order for PICC line per Dr. Love. Pre procedure and timeout done with pt's RN.    Successful insertion of a TL PICC line into pt's right brachial vein. No issues gaining access or advancing guidewire/introducer/PICC line. PICC tip terminates in the SVC according to Sherlock 3CG tip confirmation system. PICC was seen dropping into SVC with tip tracking technology and discernable peaked p waves were noted without negative deflection. A printout will be in pt's chart.     PICC is cut at 31cm and out externally 0 cm.  All lumens flush without resistance and draw back brisk blood return.    PICC site CDI with hemostasis maintained and a biopatch applied to site. Pt instructed to stay in bed and keep arm flat and still for 30 minutes  to promote hemostasis.     Lita KELLER given handoff report

## 2024-11-23 NOTE — H&P
V2.0  History and Physical      Name:  Lara Jacome /Age/Sex: 1980  (44 y.o. female)   MRN & CSN:  [id number] & [id number] Encounter Date/Time: 2024 3:58 AM EST   Location:  16 PCP: No primary care provider on file.       Hospital Day: 2    Assessment and Plan:   Lara Jacome is a 44 y.o. female with a pmh of constipation; and methadone use who presents with pulseless Ventricular tachycardia (HCC) requiring resuscitation in the setting of her bowels not moving for about 1 month    Hospital Problems             Last Modified POA    * (Principal) Ventricular tachycardia (HCC) 2024 Yes    Opioid-induced constipation 2024 Yes    Leukocytosis 2024 Yes    Hypokalemia 2024 Yes    Methadone use 2024 Yes    Tobacco abuse 2024 Yes    Lactic acidosis 2024 Yes    Stercoral colitis 2024 Yes    Syncope and collapse 2024 Yes       Plan:  Patient was started on amiodarone drip from [facility] to the emergency department and continued.  Per ED physician amiodarone drip been having transition per protocol.  ED physician discussed with cardiology and that is okay.  Lidocaine drip added and continued.  Potassium p.o. if patient can tolerate.  And potassium IV if patient can tolerate.    Trend BMP  Zosyn ordered for stercoral colitis.  Will consult GI in the setting of patient's bowels not  moving  for about 1 month and in the past () patient requiring surgical evacuation of stool per family.  Opioid induced constipation-Relistor x 1 dose ordered.  In the setting of patient being on methadone with family concerned that patient may go into withdrawal if methadone is discontinued we will continue home methadone at this time.  Tobacco abuse-smokes about 1 pack/day.  Nicotine patch ordered.  Moderate protein calorie malnutrition-BMI of 19.87 kg/m².  With mild loss of muscle mass.  When patient is stable consider nutritional supplementation.  At this  reconstruction, and/or weight based adjustment of the mA/kV was utilized to reduce the radiation dose to as low as reasonably achievable. COMPARISON: Chest radiograph same day.  CT abdomen and pelvis 05/15/2006. HISTORY: ORDERING SYSTEM PROVIDED HISTORY: Arrhythmia TECHNOLOGIST PROVIDED HISTORY: Reason for exam:->Arrhythmia Additional Contrast?->1; ORDERING SYSTEM PROVIDED HISTORY: Constipation, abdominal pain TECHNOLOGIST PROVIDED HISTORY: Reason for exam:->Constipation, abdominal pain Additional Contrast?->None Decision Support Exception - unselect if not a suspected or confirmed emergency medical condition->Emergency Medical Condition (MA) FINDINGS: CHEST CT: Pulmonary Arteries: Pulmonary arteries are adequately opacified for evaluation.  No evidence of intraluminal filling defect to suggest pulmonary embolism.  Main pulmonary artery is normal in caliber. Mediastinum: No evidence of mediastinal lymphadenopathy.  Sequela of old granulomatous process in the left hilum.  The heart and pericardium demonstrate no acute abnormality.  There is no acute abnormality of the thoracic aorta. Lungs/pleura: The lungs are without acute process.  Mild centrilobular emphysematous changes.  No focal consolidation or pulmonary edema.  No evidence of pleural effusion or pneumothorax. Soft Tissues/Bones: No acute bone or soft tissue abnormality. ABDOMEN PELVIS: Organs: The liver, gallbladder, pancreas, spleen, adrenals and kidneys reveal no acute findings.  Enlargement of the extrahepatic biliary tree with mild intrahepatic biliary enlargement, similar in appearance dating back to 2006. No stone or evidence for obstructing mass. GI/Bowel: There is no bowel dilatation or wall thickening identified.  Large stool burden throughout the colon.  Large amount of rectal stool burden in the distal sigmoid and rectum with induration of the perirectal fat. Pelvis: No acute findings. Peritoneum/Retroperitoneum: No free air or free fluid.  The

## 2024-11-23 NOTE — CONSULTS
Pulmonary Critical care Consult Note     Patient's name:  Lara Jacome  Medical Record Number: [id number]  Patient's account/billing number: [id number]  Patient's YOB: 1980  Age: 44 y.o.  Date of Admission: 11/22/2024 11:15 PM  Date of Consult: 11/23/2024      Primary Care Physician: No primary care provider on file.      Code Status: Full Code    Reason for consult: Cardiac arrest    Assessment and Plan     Cardiac arrest pulseless V. Tach, recurrent   Prolonged QT  Acute on chronic constipation  Chronic pain syndrome on methadone  Tobacco abuse        Plan:  Aggressive replacement of her electrolytes  Echocardiogram pending  EP consulted  Continue amiodarone and lidocaine  Methadone on hold  As needed medication for pain  Constipation/fecal impaction treatment per GI  Monitor closely high risk for recurrent V. Tach  Due to the immediate potential for life-threatening deterioration due to above , I spent 35 minutes providing critical care.  This time is excluding time spent performing procedures.  This note was transcribed using Dragon Dictation software. Please disregard any translational errors.        HISTORY OF PRESENT ILLNESS:   Mr./MsJo Ann Jacome is a 44 y.o. lady with past medical history stated below significant for chronic pain syndrome on methadone, history of chronic constipation, presented status postcardiac arrest pulseless V. tach status post ROSC, patient with prior history of similar episode in the past requiring CPR, QT prolongation  Chronic constipation worsening          Past Medical History:  History reviewed. No pertinent past medical history.    Past Surgical History:  History reviewed. No pertinent surgical history.    Allergies:    Allergies   Allergen Reactions    Flagyl [Metronidazole] Rash         Home Meds:   Prior to Admission medications    Medication Sig Start Date End Date Taking? Authorizing Provider   methadone 10  Constipation,  abdominal pain  TECHNOLOGIST PROVIDED HISTORY:  Reason for exam:->Constipation, abdominal pain  Additional Contrast?->None  Decision Support Exception - unselect if not a suspected or confirmed  emergency medical condition->Emergency Medical Condition (MA)    FINDINGS:  CHEST CT:    Pulmonary Arteries: Pulmonary arteries are adequately opacified for  evaluation.  No evidence of intraluminal filling defect to suggest pulmonary  embolism.  Main pulmonary artery is normal in caliber.    Mediastinum: No evidence of mediastinal lymphadenopathy.  Sequela of old  granulomatous process in the left hilum.  The heart and pericardium  demonstrate no acute abnormality.  There is no acute abnormality of the  thoracic aorta.    Lungs/pleura: The lungs are without acute process.  Mild centrilobular  emphysematous changes.  No focal consolidation or pulmonary edema.  No  evidence of pleural effusion or pneumothorax.    Soft Tissues/Bones: No acute bone or soft tissue abnormality.    ABDOMEN PELVIS:    Organs: The liver, gallbladder, pancreas, spleen, adrenals and kidneys reveal  no acute findings.  Enlargement of the extrahepatic biliary tree with mild  intrahepatic biliary enlargement, similar in appearance dating back to 2006.  No stone or evidence for obstructing mass.    GI/Bowel: There is no bowel dilatation or wall thickening identified.  Large  stool burden throughout the colon.  Large amount of rectal stool burden in  the distal sigmoid and rectum with induration of the perirectal fat.    Pelvis: No acute findings.    Peritoneum/Retroperitoneum: No free air or free fluid.  The aorta is normal  in caliber.  The visceral branches are patent. No lymphadenopathy.    Bones/Soft Tissues: No abnormality identified.    *Unless otherwise specified, incidental findings do not require dedicated  imaging follow-up.    Impression  1. No evidence of pulmonary embolism or acute pulmonary abnormality.  2. Large stool burden

## 2024-11-23 NOTE — PROGRESS NOTES
V2.0    [facility] [place]      Name:  Lara Jacome /Age/Sex: 1980  (44 y.o. female)   MRN & CSN:  [id number] & [id number] Encounter Date/Time: 2024 10:23 AM EST   Location:  [id number] PCP: No primary care provider on file.     Attending:Pk Hewitt DO       Hospital Day: 2    Assessment and Recommendations   Brief Hospital Course  Lara Jacome is a 44 y.o. female with pertinent PMHx of opiate use disorder who presented to the ED complaining of constipation and syncope.  Patient's last bowel movement was approximately a month ago and had been straining to have a bowel movement on loss consciousness.  She had several more episodes of this while in the ED and had intermittent runs of ventricular tachycardia and received 1 round of CPR.    Ventricular tachycardia  Prolonged Qtc  -On IV amiodarone and lidocaine drips  -Cardiology consulted    Stercoral colitis  Fecal impaction  Opioid-induced constipation  -IV Zosyn  -Tapwater enema encouraged, patient currently refusing  -Relistor  -Gastroenterology following, recommending GoLytely bowel prep    Hypokalemia  -Potassium replacement with goal K of 4    Opiate dependence  -Discontinue methadone as this can be QT prolonging    Diet Diet NPO Exceptions are: Sips of Water with Meds   DVT Prophylaxis [x] Lovenox, []  Heparin, [] SCDs, [] Ambulation,  [] Eliquis, [] Xarelto  [] Coumadin   Code Status Full Code   Disposition From: Home  Expected Disposition: Home  Estimated Date of Discharge: 2 to 3 days           Subjective:     Chief Complaint: Constipation, syncope    Patient was seen and examined in bed today  Vitals reviewed, labs reviewed, nursing notes reviewed  Denies any abdominal pain currently, has not yet had a bowel movement, is worried about rectal pain with an enema  Denies any nausea or vomiting  No further syncopal episodes, denies any chest pain or palpitations      Review of Systems:      Pertinent positives and negatives

## 2024-11-23 NOTE — ED TRIAGE NOTES
Patient brought to the ER for complaints of fecal impaction with multiple episodes of syncope when the stool tries to pass.  Patient says she has not had a BM in about a month.  She can feel the stool starting to come out, and passes out with the pain.     Patient says the first episode of syncope happened at 0800 this morning.  Her family administered CPR and called 911. By the time EMS arrived, patient was awake and talking.  Family member at bedside says it was never confirmed that the patient actually lost her heart beat.     At time of triage patient is alert and oriented x4.  She was able to ambulate to the room with a 1x assist.  Upon arrival to room, she was placed on a cardiac monitor which showed patient in an irregular rhythm with occassional runs of V-tach.  Patient remains conscious through rhythm changes.     Dr. Hall to bedside for immediate assessment.

## 2024-11-23 NOTE — ED PROVIDER NOTES
Emergency Department Encounter  Location: [facility] EMERGENCY DEPARTMENT  Open Note Time:  1:26 AM EST    Patient: Lara Jacome  MRN: [id number]  : 1980  Date of evaluation: 2024  ED Provider: Leandra Rosado MD    4:02 AM  Lara Jacome was checked out to me by Dr. Cardona. Please see his/her initial documentation for details of the patient's initial ED presentation, physical exam and completed studies.    In brief, Lara Jacome is a 44 y.o. female who presented to the emergency department for syncope, fecal impaction, and vaginal bleeding.    Current studies pending and/or plan: CT, then admission    I wore goggles, surgical mask, N95 mask and gloves when I evaluated the patient.    Physical Exam  Vitals and nursing note reviewed. Exam conducted with a chaperone present.   Constitutional:       General: She is not in acute distress.     Appearance: Normal appearance. She is obese. She is ill-appearing. She is not toxic-appearing or diaphoretic.   HENT:      Head: Normocephalic and atraumatic.   Eyes:      General:         Right eye: No discharge.         Left eye: No discharge.      Extraocular Movements: Extraocular movements intact.      Conjunctiva/sclera: Conjunctivae normal.   Cardiovascular:      Rate and Rhythm: Normal rate and regular rhythm.      Pulses: Normal pulses.           Radial pulses are 2+ on the right side and 2+ on the left side.        Posterior tibial pulses are 2+ on the right side and 2+ on the left side.      Heart sounds: Normal heart sounds. No murmur heard.     No friction rub. No gallop.   Pulmonary:      Effort: Pulmonary effort is normal. No respiratory distress.      Breath sounds: Normal breath sounds. No stridor. No wheezing, rhonchi or rales.   Genitourinary:     Rectum: Tenderness and external hemorrhoid present. No internal hemorrhoid. Normal anal tone.      Comments: Impacted stool in rectal vault, pt had significant tenderness during VÍCTOR, no

## 2024-11-23 NOTE — ED PROVIDER NOTES
[facility]  EMERGENCY DEPARTMENT ENCOUNTER        Pt Name: Lara Jacome  MRN: [id number]  Birthdate 1980  Date of evaluation: 11/22/2024  Provider: Bruno Hall DO  PCP: No primary care provider on file.  Note Started: 11:16 PM EST 11/22/24    CHIEF COMPLAINT      Constipation, Syncope    HISTORY OF PRESENT ILLNESS: 1 or more Elements     Chief Complaint   Patient presents with    Loss of Consciousness    Fecal Impaction     History from : Patient and Family (Mother)  Limitations to history : None    Lara Jacome is a 44 y.o. female who presents to the emergency department secondary to concern for constipation and syncope.  Mother reports that the patient was trying to pass a fecal impaction which has happened before.  She had multiple episodes of syncope and she states that she thinks that she may have vasovagal, however patient complained of chest pain after the event.    In triage, patient appeared to go into a run of V. Tach. Patient and mother report no previous cardiac history.    Past medical history noted below. Aside from what is stated above denies any other symptoms or modifying factors.   reports that she has been smoking cigarettes. She started smoking about 10 months ago. She has a 0.9 pack-year smoking history. She has never used smokeless tobacco. She reports that she does not currently use drugs after having used the following drugs: IV and Other-see comments. She reports that she does not drink alcohol.  Nursing Notes were all reviewed and agreed with or any disagreements addressed in HPI/MDM.  REVIEW OF SYSTEMS :    Review of Systems   Constitutional:  Negative for chills and fever.   HENT:  Negative for congestion and rhinorrhea.    Eyes:  Negative for pain and redness.   Respiratory:  Negative for cough and shortness of breath.    Cardiovascular:  Positive for chest pain. Negative for leg swelling.   Gastrointestinal:  Positive for constipation. Negative

## 2024-11-23 NOTE — PROGRESS NOTES
NAME:  Lara Jacome  YOB: 1980  MEDICAL RECORD NUMBER:  [id number]    Shift Summary: Admitted from ED due to syncope and fecal impaction. Compazine given for nausea. Able to take only half dose of Potassium tablets. Complained of pain on IV site where the potassium was infusing, stopped. PICC line was ordered and requested, awaiting insertion of PICC. Seen by GI this morning.    Family updated: Yes:       Rhythm:  Vent Trigeminy    Most recent vitals:   Visit Vitals  /69   Pulse 61   Temp 98.8 °F (37.1 °C) (Oral)   Resp 18   Ht 1.664 m (5' 5.51\")   Wt 50.6 kg (111 lb 8.8 oz)   SpO2 100%   BMI 18.27 kg/m²           No data found.    No data found.      Respiratory support needed (if any):  - RA    Admission weight Weight - Scale: 55 kg (121 lb 4.1 oz) (11/22/24 3775)    Today's weight    Wt Readings from Last 1 Encounters:   11/23/24 50.6 kg (111 lb 8.8 oz)        Zhou need assessed each shift: N/A - no zhou present  UOP >30ml/hr: ANABEL, patient was admitted at 5AM  Last documented BM (in last 48 hrs):  Patient Vitals for the past 48 hrs:   Last BM (including prior to admit)   11/23/24 0500 11/23/24                Restraints (in use currently or dc'd in last 12 hrs): No    Order current and documentation up to date? No    Lines/Drains reviewed @ bedside.  Peripheral IV 11/23/24 Left External Jugular (Active)   Number of days: 0       Peripheral IV 11/23/24 Right Forearm (Active)   Number of days: 0       Peripheral IV 11/23/24 Left;Anterior Forearm (Active)   Number of days: 0         Drip rates at handoff:    amiodarone 0.5 mg/min (11/23/24 0604)    lidocaine 1 mg/min (11/23/24 0604)    sodium chloride         Lab Data:   CBC:   Recent Labs     11/22/24  2345   WBC 23.1*   HGB 14.2   HCT 42.1   .5*        BMP:    Recent Labs     11/22/24  2345      K 3.2*   CO2 20*   BUN 9   CREATININE 1.0     LIVR:   Recent Labs     11/22/24  2345   AST 28   ALT <5*     PT/INR: No results

## 2024-11-24 LAB
ALBUMIN SERPL-MCNC: 3.7 G/DL (ref 3.4–5)
ALP SERPL-CCNC: 48 U/L (ref 40–129)
ALT SERPL-CCNC: 10 U/L (ref 10–40)
AMPHETAMINES UR QL SCN>1000 NG/ML: ABNORMAL
ANION GAP SERPL CALCULATED.3IONS-SCNC: 11 MMOL/L (ref 3–16)
AST SERPL-CCNC: 20 U/L (ref 15–37)
BARBITURATES UR QL SCN>200 NG/ML: ABNORMAL
BASOPHILS # BLD: 0 K/UL (ref 0–0.2)
BASOPHILS NFR BLD: 0.2 %
BENZODIAZ UR QL SCN>200 NG/ML: ABNORMAL
BILIRUB DIRECT SERPL-MCNC: 0.3 MG/DL (ref 0–0.3)
BILIRUB INDIRECT SERPL-MCNC: 0.2 MG/DL (ref 0–1)
BILIRUB SERPL-MCNC: 0.5 MG/DL (ref 0–1)
BUN SERPL-MCNC: 7 MG/DL (ref 7–20)
CALCIUM SERPL-MCNC: 8.7 MG/DL (ref 8.3–10.6)
CANNABINOIDS UR QL SCN>50 NG/ML: POSITIVE
CHLORIDE SERPL-SCNC: 109 MMOL/L (ref 99–110)
CO2 SERPL-SCNC: 22 MMOL/L (ref 21–32)
COCAINE UR QL SCN: ABNORMAL
CREAT SERPL-MCNC: 0.9 MG/DL (ref 0.6–1.1)
DEPRECATED RDW RBC AUTO: 15.6 % (ref 12.4–15.4)
DRUG SCREEN COMMENT UR-IMP: ABNORMAL
EOSINOPHIL # BLD: 0 K/UL (ref 0–0.6)
EOSINOPHIL NFR BLD: 0.1 %
FENTANYL SCREEN, URINE: POSITIVE
GFR SERPLBLD CREATININE-BSD FMLA CKD-EPI: 80 ML/MIN/{1.73_M2}
GLUCOSE SERPL-MCNC: 125 MG/DL (ref 70–99)
HCT VFR BLD AUTO: 35.8 % (ref 36–48)
HGB BLD-MCNC: 12.1 G/DL (ref 12–16)
LYMPHOCYTES # BLD: 2 K/UL (ref 1–5.1)
LYMPHOCYTES NFR BLD: 16.1 %
MAGNESIUM SERPL-MCNC: 2.03 MG/DL (ref 1.8–2.4)
MCH RBC QN AUTO: 36 PG (ref 26–34)
MCHC RBC AUTO-ENTMCNC: 33.7 G/DL (ref 31–36)
MCV RBC AUTO: 106.6 FL (ref 80–100)
METHADONE UR QL SCN>300 NG/ML: POSITIVE
MONOCYTES # BLD: 1.1 K/UL (ref 0–1.3)
MONOCYTES NFR BLD: 8.9 %
NEUTROPHILS # BLD: 9.5 K/UL (ref 1.7–7.7)
NEUTROPHILS NFR BLD: 74.7 %
OPIATES UR QL SCN>300 NG/ML: POSITIVE
OXYCODONE UR QL SCN: ABNORMAL
PCP UR QL SCN>25 NG/ML: ABNORMAL
PH UR STRIP: 5 [PH]
PHOSPHATE SERPL-MCNC: 2.5 MG/DL (ref 2.5–4.9)
PLATELET # BLD AUTO: 281 K/UL (ref 135–450)
PMV BLD AUTO: 8.3 FL (ref 5–10.5)
POTASSIUM SERPL-SCNC: 3.3 MMOL/L (ref 3.5–5.1)
PROT SERPL-MCNC: 5.8 G/DL (ref 6.4–8.2)
RBC # BLD AUTO: 3.36 M/UL (ref 4–5.2)
SODIUM SERPL-SCNC: 142 MMOL/L (ref 136–145)
WBC # BLD AUTO: 12.7 K/UL (ref 4–11)

## 2024-11-24 PROCEDURE — 6360000002 HC RX W HCPCS: Performed by: HOSPITALIST

## 2024-11-24 PROCEDURE — 84100 ASSAY OF PHOSPHORUS: CPT

## 2024-11-24 PROCEDURE — 2580000003 HC RX 258: Performed by: HOSPITALIST

## 2024-11-24 PROCEDURE — 6360000002 HC RX W HCPCS: Performed by: STUDENT IN AN ORGANIZED HEALTH CARE EDUCATION/TRAINING PROGRAM

## 2024-11-24 PROCEDURE — 80048 BASIC METABOLIC PNL TOTAL CA: CPT

## 2024-11-24 PROCEDURE — 99223 1ST HOSP IP/OBS HIGH 75: CPT | Performed by: INTERNAL MEDICINE

## 2024-11-24 PROCEDURE — 6370000000 HC RX 637 (ALT 250 FOR IP): Performed by: HOSPITALIST

## 2024-11-24 PROCEDURE — 2060000000 HC ICU INTERMEDIATE R&B

## 2024-11-24 PROCEDURE — 6360000002 HC RX W HCPCS: Performed by: INTERNAL MEDICINE

## 2024-11-24 PROCEDURE — 80307 DRUG TEST PRSMV CHEM ANLYZR: CPT

## 2024-11-24 PROCEDURE — 36592 COLLECT BLOOD FROM PICC: CPT

## 2024-11-24 PROCEDURE — 6370000000 HC RX 637 (ALT 250 FOR IP): Performed by: STUDENT IN AN ORGANIZED HEALTH CARE EDUCATION/TRAINING PROGRAM

## 2024-11-24 PROCEDURE — 94760 N-INVAS EAR/PLS OXIMETRY 1: CPT

## 2024-11-24 PROCEDURE — 83735 ASSAY OF MAGNESIUM: CPT

## 2024-11-24 PROCEDURE — 85025 COMPLETE CBC W/AUTO DIFF WBC: CPT

## 2024-11-24 PROCEDURE — 99291 CRITICAL CARE FIRST HOUR: CPT | Performed by: INTERNAL MEDICINE

## 2024-11-24 PROCEDURE — 80076 HEPATIC FUNCTION PANEL: CPT

## 2024-11-24 RX ORDER — METHADONE HYDROCHLORIDE 10 MG/1
40 TABLET ORAL DAILY
Status: DISCONTINUED | OUTPATIENT
Start: 2024-11-24 | End: 2024-11-25 | Stop reason: HOSPADM

## 2024-11-24 RX ORDER — METHOCARBAMOL 750 MG/1
750 TABLET, FILM COATED ORAL EVERY 6 HOURS PRN
Status: DISCONTINUED | OUTPATIENT
Start: 2024-11-24 | End: 2024-11-25 | Stop reason: HOSPADM

## 2024-11-24 RX ORDER — HYDROXYZINE PAMOATE 25 MG/1
50 CAPSULE ORAL EVERY 8 HOURS PRN
Status: DISCONTINUED | OUTPATIENT
Start: 2024-11-24 | End: 2024-11-25 | Stop reason: HOSPADM

## 2024-11-24 RX ORDER — KETOROLAC TROMETHAMINE 30 MG/ML
30 INJECTION, SOLUTION INTRAMUSCULAR; INTRAVENOUS EVERY 6 HOURS PRN
Status: DISCONTINUED | OUTPATIENT
Start: 2024-11-24 | End: 2024-11-25 | Stop reason: HOSPADM

## 2024-11-24 RX ORDER — MORPHINE SULFATE 2 MG/ML
2 INJECTION, SOLUTION INTRAMUSCULAR; INTRAVENOUS EVERY 4 HOURS PRN
Status: DISCONTINUED | OUTPATIENT
Start: 2024-11-24 | End: 2024-11-25 | Stop reason: HOSPADM

## 2024-11-24 RX ORDER — CLONIDINE HYDROCHLORIDE 0.1 MG/1
0.1 TABLET ORAL EVERY 6 HOURS PRN
Status: DISCONTINUED | OUTPATIENT
Start: 2024-11-24 | End: 2024-11-25 | Stop reason: HOSPADM

## 2024-11-24 RX ORDER — POTASSIUM CHLORIDE 29.8 MG/ML
20 INJECTION INTRAVENOUS
Status: COMPLETED | OUTPATIENT
Start: 2024-11-24 | End: 2024-11-24

## 2024-11-24 RX ADMIN — SODIUM CHLORIDE, PRESERVATIVE FREE 10 ML: 5 INJECTION INTRAVENOUS at 21:01

## 2024-11-24 RX ADMIN — NALOXEGOL OXALATE 12.5 MG: 12.5 TABLET, FILM COATED ORAL at 09:10

## 2024-11-24 RX ADMIN — PROCHLORPERAZINE EDISYLATE 10 MG: 5 INJECTION INTRAMUSCULAR; INTRAVENOUS at 12:47

## 2024-11-24 RX ADMIN — POTASSIUM CHLORIDE 20 MEQ: 29.8 INJECTION, SOLUTION INTRAVENOUS at 11:03

## 2024-11-24 RX ADMIN — AMIODARONE HYDROCHLORIDE 0.5 MG/MIN: 50 INJECTION, SOLUTION INTRAVENOUS at 19:04

## 2024-11-24 RX ADMIN — PROCHLORPERAZINE EDISYLATE 10 MG: 5 INJECTION INTRAMUSCULAR; INTRAVENOUS at 04:36

## 2024-11-24 RX ADMIN — POTASSIUM CHLORIDE 20 MEQ: 29.8 INJECTION, SOLUTION INTRAVENOUS at 10:01

## 2024-11-24 RX ADMIN — PIPERACILLIN AND TAZOBACTAM 3375 MG: 3; .375 INJECTION, POWDER, LYOPHILIZED, FOR SOLUTION INTRAVENOUS at 02:14

## 2024-11-24 RX ADMIN — SODIUM CHLORIDE, PRESERVATIVE FREE 10 ML: 5 INJECTION INTRAVENOUS at 09:13

## 2024-11-24 RX ADMIN — AMIODARONE HYDROCHLORIDE 0.5 MG/MIN: 50 INJECTION, SOLUTION INTRAVENOUS at 04:39

## 2024-11-24 RX ADMIN — PIPERACILLIN AND TAZOBACTAM 3375 MG: 3; .375 INJECTION, POWDER, LYOPHILIZED, FOR SOLUTION INTRAVENOUS at 09:20

## 2024-11-24 RX ADMIN — KETOROLAC TROMETHAMINE 30 MG: 30 INJECTION, SOLUTION INTRAMUSCULAR at 12:23

## 2024-11-24 RX ADMIN — POTASSIUM CHLORIDE 20 MEQ: 29.8 INJECTION, SOLUTION INTRAVENOUS at 07:16

## 2024-11-24 RX ADMIN — POTASSIUM CHLORIDE 20 MEQ: 29.8 INJECTION, SOLUTION INTRAVENOUS at 09:12

## 2024-11-24 RX ADMIN — POTASSIUM CHLORIDE 20 MEQ: 29.8 INJECTION, SOLUTION INTRAVENOUS at 12:05

## 2024-11-24 RX ADMIN — PIPERACILLIN AND TAZOBACTAM 3375 MG: 3; .375 INJECTION, POWDER, LYOPHILIZED, FOR SOLUTION INTRAVENOUS at 17:33

## 2024-11-24 RX ADMIN — POTASSIUM CHLORIDE 20 MEQ: 29.8 INJECTION, SOLUTION INTRAVENOUS at 06:02

## 2024-11-24 ASSESSMENT — PAIN SCALES - WONG BAKER: WONGBAKER_NUMERICALRESPONSE: HURTS LITTLE MORE

## 2024-11-24 ASSESSMENT — PAIN DESCRIPTION - LOCATION: LOCATION: ABDOMEN

## 2024-11-24 ASSESSMENT — PAIN SCALES - GENERAL
PAINLEVEL_OUTOF10: 0
PAINLEVEL_OUTOF10: 10

## 2024-11-24 ASSESSMENT — PAIN DESCRIPTION - ORIENTATION: ORIENTATION: MID

## 2024-11-24 NOTE — PROGRESS NOTES
V2.0    [facility] [place]      Name:  Lara Jacome /Age/Sex: 1980  (44 y.o. female)   MRN & CSN:  [id number] & [id number] Encounter Date/Time: 2024 10:23 AM EST   Location:  [id number] PCP: No primary care provider on file.     Attending:Pk Hewitt DO       Hospital Day: 3    Assessment and Recommendations   Brief Hospital Course  Lara Jacome is a 44 y.o. female with pertinent PMHx of opiate use disorder who presented to the ED complaining of constipation and syncope.  Patient's last bowel movement was approximately a month ago and had been straining to have a bowel movement on loss consciousness.  She had several more episodes of this while in the ED and had intermittent runs of ventricular tachycardia and received 1 round of CPR.    Ventricular tachycardia  Prolonged Qtc  -Transthoracic echo showed normal LV with EF 60 to 65%, normal RV  -IV amiodarone for today  -Lidocaine discontinued  -Cardiology following, note reviewed today , discontinuing lidocaine as QTc has improved    Stercoral colitis  Fecal impaction  Opioid-induced constipation  -Continue antibiotics with IV Zosyn  -Movantik  -Gastroenterology following, note reviewed today , recommending GoLytely bowel prep and continuing antibiotics    Hypokalemia  -Continue replace potassium with goal K of 4    Opiate dependence  -Hold off on resuming methadone at this time and it is a QT prolonging medication, if necessary we restarted a lower dose than home dose  -COWS protocol with symptomatic management    Diet Diet NPO Exceptions are: Sips of Water with Meds   DVT Prophylaxis [x] Lovenox, []  Heparin, [] SCDs, [] Ambulation,  [] Eliquis, [] Xarelto  [] Coumadin   Code Status Full Code   Disposition From: Home  Expected Disposition: Home  Estimated Date of Discharge: 2 to 3 days           Subjective:     Chief Complaint: Constipation, syncope    Patient was seen and examined laying in bed today  Labs reviewed, vitals  Organs: The liver, gallbladder, pancreas, spleen, adrenals and kidneys reveal no acute findings.  Enlargement of the extrahepatic biliary tree with mild intrahepatic biliary enlargement, similar in appearance dating back to 2006. No stone or evidence for obstructing mass. GI/Bowel: There is no bowel dilatation or wall thickening identified.  Large stool burden throughout the colon.  Large amount of rectal stool burden in the distal sigmoid and rectum with induration of the perirectal fat. Pelvis: No acute findings. Peritoneum/Retroperitoneum: No free air or free fluid.  The aorta is normal in caliber.  The visceral branches are patent. No lymphadenopathy. Bones/Soft Tissues: No abnormality identified. *Unless otherwise specified, incidental findings do not require dedicated imaging follow-up.     1. No evidence of pulmonary embolism or acute pulmonary abnormality. 2. Large stool burden throughout the colon with large amount of rectal stool burden in the distal sigmoid and rectum with induration of the perirectal fat. Findings are compatible with stercoral colitis and fecal impaction. 3. Chronic biliary enlargement without evidence for obstructing mass or stone.     XR CHEST PORTABLE    Result Date: 11/23/2024  EXAMINATION: ONE XRAY VIEW OF THE CHEST 11/22/2024 11:22 pm COMPARISON: None. HISTORY: ORDERING SYSTEM PROVIDED HISTORY: v-tach TECHNOLOGIST PROVIDED HISTORY: Reason for exam:->v-tach Reason for Exam: vtach FINDINGS: The cardiomediastinal silhouette is within normal limits. No focal airspace consolidation, pneumothorax, or sizeable pleural effusion.     No acute cardiopulmonary abnormality.       CBC:   Recent Labs     11/22/24  2345 11/23/24  0753 11/24/24  0430   WBC 23.1* 19.0* 12.7*   HGB 14.2 13.2 12.1    321 281     BMP:    Recent Labs     11/23/24  0753 11/23/24  1845 11/24/24  0430    148* 142   K 2.5* 3.4* 3.3*    109 109   CO2 22 22 22   BUN 8 8 7   CREATININE 1.1 1.0 0.9   GLUCOSE

## 2024-11-24 NOTE — PROGRESS NOTES
NAME:  Lara Jacome  YOB: 1980  MEDICAL RECORD NUMBER:  [id number]    Shift Summary: Converted to sinus rhythm with few episodes of vent bigeminy. Lidocaine drip off. Incontinent os stool. Downgraded to PCU. COWS every 4 hrly.    Family updated: Yes:  At bedside    Rhythm: Normal Sinus Rhythm , Few vent bigeminy    Most recent vitals:   Visit Vitals  BP (!) 124/111   Pulse 90   Temp 98.2 °F (36.8 °C) (Oral)   Resp 15   Ht 1.651 m (5' 5\")   Wt 47.6 kg (104 lb 15 oz)   SpO2 100%   BMI 17.46 kg/m²           No data found.    No data found.      Respiratory support needed (if any):  - RA    Admission weight Weight - Scale: 55 kg (121 lb 4.1 oz) (11/22/24 4075)    Today's weight    Wt Readings from Last 1 Encounters:   11/24/24 47.6 kg (104 lb 15 oz)        Zhou need assessed each shift: N/A - no zhou present  UOP >30ml/hr: YES, Incontinent in briefs.  Last documented BM (in last 48 hrs):  Patient Vitals for the past 48 hrs:   Last BM (including prior to admit) Stool Occurrence   11/23/24 0500 11/23/24 --   11/23/24 0800 11/23/24 --   11/23/24 1000 11/23/24 --   11/23/24 1200 11/23/24 --   11/23/24 1600 11/23/24 --   11/23/24 2000 11/23/24 --   11/23/24 2200 11/23/24 1   11/24/24 0600 11/24/24 1   11/24/24 0800 11/24/24 --   11/24/24 1400 11/24/24 --                Restraints (in use currently or dc'd in last 12 hrs): No    Order current and documentation up to date? No    Lines/Drains reviewed @ bedside.  PICC 11/23/24 Right Brachial (Active)   Number of days: 1       Peripheral IV 11/23/24 Right Forearm (Active)   Number of days: 1       Peripheral IV 11/23/24 Left;Anterior Forearm (Active)   Number of days: 1         Drip rates at handoff:    amiodarone 0.5 mg/min (11/24/24 1904)    lidocaine Stopped (11/24/24 1125)    sodium chloride         Lab Data:   CBC:   Recent Labs     11/23/24  0753 11/24/24  0430   WBC 19.0* 12.7*   HGB 13.2 12.1   HCT 39.1 35.8*   .1* 106.6*    281     BMP:

## 2024-11-24 NOTE — PROGRESS NOTES
NAME:  Lara Jacome  YOB: 1980  MEDICAL RECORD NUMBER:  [id number]    Shift Summary: Patient drunk less than 200ml of GoLYTELY overnight despite encouragement and explanation of the importance of drinking the GoLYTELY. Compazine given twice overnight. Had small-moderate loose bowel movement x 2 overnight. Urine sample was collected for toxicology.    Family updated: Yes:  sister at bedside    Rhythm: Normal Sinus Rhythm / Vent Bigeminy    Most recent vitals:   Visit Vitals  BP (!) 153/82   Pulse 81   Temp 98.4 °F (36.9 °C) (Axillary)   Resp 12   Ht 1.651 m (5' 5\")   Wt 47.6 kg (104 lb 15 oz)   SpO2 91%   BMI 17.46 kg/m²           No data found.    No data found.      Respiratory support needed (if any):  - RA    Admission weight Weight - Scale: 55 kg (121 lb 4.1 oz) (11/22/24 1715)    Today's weight    Wt Readings from Last 1 Encounters:   11/24/24 47.6 kg (104 lb 15 oz)        Zhou need assessed each shift: N/A - no zhou present  UOP >30ml/hr: YES  Last documented BM (in last 48 hrs):  Patient Vitals for the past 48 hrs:   Last BM (including prior to admit) Stool Occurrence   11/23/24 0500 11/23/24 --   11/23/24 0800 11/23/24 --   11/23/24 1000 11/23/24 --   11/23/24 1200 11/23/24 --   11/23/24 1600 11/23/24 --   11/23/24 2000 11/23/24 --   11/23/24 2200 11/23/24 1   11/24/24 0600 11/24/24 1                Restraints (in use currently or dc'd in last 12 hrs): No    Order current and documentation up to date? No    Lines/Drains reviewed @ bedside.  PICC 11/23/24 Right Brachial (Active)   Number of days: 0       Peripheral IV 11/23/24 Right Forearm (Active)   Number of days: 1       Peripheral IV 11/23/24 Left;Anterior Forearm (Active)   Number of days: 1         Drip rates at handoff:    amiodarone 0.5 mg/min (11/24/24 0439)    lidocaine 1 mg/min (11/23/24 7317)    sodium chloride         Lab Data:   CBC:   Recent Labs     11/23/24  0753 11/24/24  3440   WBC 19.0* 12.7*   HGB 13.2 12.1   HCT 39.1

## 2024-11-24 NOTE — PLAN OF CARE
Problem: Discharge Planning  Goal: Discharge to home or other facility with appropriate resources  Outcome: Progressing     Problem: Safety - Adult  Goal: Free from fall injury  Outcome: Progressing  Flowsheets (Taken 11/23/2024 2000 by Elenita Michele, RN)  Free From Fall Injury: Instruct family/caregiver on patient safety     Problem: Skin/Tissue Integrity  Goal: Absence of new skin breakdown  Description: 1.  Monitor for areas of redness and/or skin breakdown  2.  Assess vascular access sites hourly  3.  Every 4-6 hours minimum:  Change oxygen saturation probe site  4.  Every 4-6 hours:  If on nasal continuous positive airway pressure, respiratory therapy assess nares and determine need for appliance change or resting period.  Outcome: Progressing     Problem: ABCDS Injury Assessment  Goal: Absence of physical injury  Outcome: Progressing  Flowsheets (Taken 11/23/2024 2000 by Elenita Michele, RN)  Absence of Physical Injury: Implement safety measures based on patient assessment     Problem: Pain  Goal: Verbalizes/displays adequate comfort level or baseline comfort level  Outcome: Progressing  Flowsheets (Taken 11/23/2024 2030 by Elenita Michele, RN)  Verbalizes/displays adequate comfort level or baseline comfort level:   Encourage patient to monitor pain and request assistance   Assess pain using appropriate pain scale   Administer analgesics based on type and severity of pain and evaluate response   Implement non-pharmacological measures as appropriate and evaluate response     Problem: Respiratory - Adult  Goal: Achieves optimal ventilation and oxygenation  11/24/2024 0806 by Lilliam Dove, RN  Outcome: Progressing  11/23/2024 2221 by Elenita Michele, RN  Outcome: Progressing  Flowsheets (Taken 11/23/2024 2000)  Achieves optimal ventilation and oxygenation:   Assess for changes in respiratory status   Assess for changes in mentation and behavior   Position to facilitate oxygenation and minimize

## 2024-11-24 NOTE — PLAN OF CARE
Problem: Safety - Adult  Goal: Free from fall injury  Recent Flowsheet Documentation  Taken 11/23/2024 2000 by Elenita Michele RN  Free From Fall Injury: Instruct family/caregiver on patient safety     Problem: ABCDS Injury Assessment  Goal: Absence of physical injury  Recent Flowsheet Documentation  Taken 11/23/2024 2000 by Elenita Michele RN  Absence of Physical Injury: Implement safety measures based on patient assessment     Problem: Pain  Goal: Verbalizes/displays adequate comfort level or baseline comfort level  Recent Flowsheet Documentation  Taken 11/23/2024 2030 by Elenita Michele RN  Verbalizes/displays adequate comfort level or baseline comfort level:   Encourage patient to monitor pain and request assistance   Assess pain using appropriate pain scale   Administer analgesics based on type and severity of pain and evaluate response   Implement non-pharmacological measures as appropriate and evaluate response     Problem: Respiratory - Adult  Goal: Achieves optimal ventilation and oxygenation  Outcome: Progressing  Flowsheets (Taken 11/23/2024 2000)  Achieves optimal ventilation and oxygenation:   Assess for changes in respiratory status   Assess for changes in mentation and behavior   Position to facilitate oxygenation and minimize respiratory effort   Encourage broncho-pulmonary hygiene including cough, deep breathe, incentive spirometry   Assess and instruct to report shortness of breath or any respiratory difficulty     Problem: Cardiovascular - Adult  Goal: Maintains optimal cardiac output and hemodynamic stability  Outcome: Progressing  Flowsheets (Taken 11/23/2024 2000)  Maintains optimal cardiac output and hemodynamic stability:   Monitor blood pressure and heart rate   Monitor urine output and notify Licensed Independent Practitioner for values outside of normal range   Assess for signs of decreased cardiac output     Problem: Cardiovascular - Adult  Goal: Absence of cardiac dysrhythmias or

## 2024-11-24 NOTE — PROGRESS NOTES
NAME:  Lara Jacome  YOB: 1980  MEDICAL RECORD NUMBER:  [id number]    Shift Summary: Refused tape water enema, trying to take Golytely but taking it barely. Echo done, See results. Placed NG but patient removed it right away.    Family updated: Yes:  At bedside    Rhythm:  Vent trigeminy    Most recent vitals:   Visit Vitals  BP (!) 152/82   Pulse 89   Temp 98.2 °F (36.8 °C) (Oral)   Resp 24   Ht 1.651 m (5' 5\")   Wt 50.3 kg (111 lb)   SpO2 96%   BMI 18.47 kg/m²           No data found.    No data found.      Respiratory support needed (if any):  - RA    Admission weight Weight - Scale: 55 kg (121 lb 4.1 oz) (11/22/24 5955)    Today's weight    Wt Readings from Last 1 Encounters:   11/23/24 50.3 kg (111 lb)        Zhou need assessed each shift: N/A - no zhou present  UOP >30ml/hr: NO - Could not measure, incontinent in briefs.  Last documented BM (in last 48 hrs):  Patient Vitals for the past 48 hrs:   Last BM (including prior to admit)   11/23/24 0500 11/23/24 11/23/24 0800 11/23/24 11/23/24 1000 11/23/24 11/23/24 1200 11/23/24 11/23/24 1600 11/23/24                Restraints (in use currently or dc'd in last 12 hrs): No    Order current and documentation up to date? No    Lines/Drains reviewed @ bedside.  PICC 11/23/24 Right Brachial (Active)   Number of days: 0       Peripheral IV 11/23/24 Right Forearm (Active)   Number of days: 0       Peripheral IV 11/23/24 Left;Anterior Forearm (Active)   Number of days: 0         Drip rates at handoff:    amiodarone 0.5 mg/min (11/23/24 1857)    lidocaine 1 mg/min (11/23/24 1857)    sodium chloride         Lab Data:   CBC:   Recent Labs     11/22/24  2345 11/23/24  0753   WBC 23.1* 19.0*   HGB 14.2 13.2   HCT 42.1 39.1   .5* 106.1*    321     BMP:    Recent Labs     11/22/24  2345 11/23/24  0753    141   K 3.2* 2.5*   CO2 20* 22   BUN 9 8   CREATININE 1.0 1.1     LIVR:   Recent Labs     11/22/24  2345   AST 28   ALT <5*      PT/INR: No results for input(s): \"INR\" in the last 72 hours.    Invalid input(s): \"PROT\"  APTT: No results for input(s): \"APTT\" in the last 72 hours.  ABG: No results for input(s): \"PHART\", \"EAP5YJG\", \"PO2ART\" in the last 72 hours.    Any consults during the shift? No    Any signed and held orders to be released?  No        4 Eyes Skin Assessment       The patient is being assessed for  Shift Handoff    I agree that at least one RN has performed a thorough Head to Toe Skin Assessment on the patient. ALL assessment sites listed below have been assessed.      Areas assessed by both nurses: Head, Face, Ears, Shoulders, Back, Chest, Arms, Elbows, Hands, Sacrum. Buttock, Coccyx, Ischium, Legs. Feet and Heels, and Under Medical Devices         Does the Patient have a Wound? No noted wound(s)    Wound Care Orders initiated by RN: No       Irvin Prevention initiated by RN: Yes    Pressure Injury (Stage 3,4, Unstageable, DTI, NWPT, and Complex wounds) if present, place Wound referral order by RN under : No    New Ostomies, if present place, Ostomy referral order under : No     Nurse 1 eSignature: Electronically signed by Lilliam Dove RN on 11/23/24 at 7:21 PM EST    **SHARE this note so that the co-signing nurse can place an eSignature**    Nurse 2 eSignature: Electronically signed by Elenita Michele RN on 11/23/24 at 8:07 PM EST

## 2024-11-24 NOTE — PROGRESS NOTES
Pulmonary Critical care Progress Note     Patient's name:  Lara Jacome  Medical Record Number: [id number]  Patient's account/billing number: [id number]  Patient's YOB: 1980  Age: 44 y.o.  Date of Admission: 11/22/2024 11:15 PM  Date of Consult: 11/24/2024      Primary Care Physician: No primary care provider on file.      Code Status: Full Code    Reason for consult: Cardiac arrest    Assessment and Plan     Cardiac arrest pulseless V. Tach, recurrent   Prolonged QT  Acute on chronic constipation with fecal impaction  stercoral colitis   Chronic pain syndrome on methadone  Multisubstance abuse        Plan:  Aggressive replacement of her electrolytes  amiodarone and lidocaine  Methadone on hold  Constipation/fecal impaction treatment per GI, patient refusing medications, ? Disimpaction under sedation.  Monitor closely high risk for recurrent V. Tach  Due to the immediate potential for life-threatening deterioration due to above , I spent 31 minutes providing critical care.  This time is excluding time spent performing procedures.  This note was transcribed using Dragon Dictation software. Please disregard any translational errors.    Subjective:    Did not drink GoLytely  NG tube was placed but she pulled it out  Refused enema    HISTORY OF PRESENT ILLNESS:   Mr./MsJo Ann Jacome is a 44 y.o. lady with past medical history stated below significant for chronic pain syndrome on methadone, history of chronic constipation, presented status postcardiac arrest pulseless V. tach status post ROSC, patient with prior history of similar episode in the past requiring CPR, QT prolongation  Chronic constipation worsening      REVIEW OF SYSTEMS:  Review of Systems -   Nausea  Abdominal pain  No fever no chills  No chest pain      Physical Exam:    Vitals: /74   Pulse 72   Temp 98.3 °F (36.8 °C) (Oral)   Resp 18   Ht 1.651 m (5' 5\")   Wt 47.6 kg (104 lb 15  silhouette is within normal limits.    No focal airspace consolidation, pneumothorax, or sizeable pleural effusion.    Impression  No acute cardiopulmonary abnormality.                     Rivera Lopez MD, M.D.            11/24/2024, 9:20 AM

## 2024-11-24 NOTE — PROGRESS NOTES
Cardiac Electrophysiology Progress Note     Admit Date: 2024     Reason for follow up: VT    HPI and Interval History:   Patient seen and examined. Clinical notes reviewed.   Telemetry reviewed. No new complaint today.   No major events overnight.   Denies having angina, shortness of breath, dyspnea on exertion, Orthopnea, PND at the time of this visit.    Review of System:  Pertinent negatives and positives are mentioned in the HPI and interval history above. The rest are negative.     Physical Examination:  Vitals:    24 0805   BP:    Pulse: 72   Resp: 18   Temp:    SpO2: 95%        Intake/Output Summary (Last 24 hours) at 2024 0938  Last data filed at 2024 0400  Gross per 24 hour   Intake 1458.45 ml   Output --   Net 1458.45 ml     In: 1458.5 [P.O.:200; I.V.:557]  Out: -    Wt Readings from Last 3 Encounters:   24 47.6 kg (104 lb 15 oz)   19 57.2 kg (126 lb)     Temp  Av.4 °F (36.9 °C)  Min: 98.2 °F (36.8 °C)  Max: 98.6 °F (37 °C)  Pulse  Av.4  Min: 58  Max: 89  BP  Min: 101/63  Max: 153/82  SpO2  Av %  Min: 91 %  Max: 100 %    Telemetry: Sinus rhythm with PVCs  General: Calm and not in acute distress.   HEENT: Atraumatic normocephalic. Normal eye movements.   Neck: No JVP.  Cardiac: RRR, no appreciable murmurs.   Lungs: Not labored. Clear to ausculation.   Extremities. No pitting edema  Neurology: A&O x3.   Psychiatry: Normal affect and mood.    Labs, diagnostic and imaging results reviewed.   Reviewed.   Recent Labs     24  0753 24  1845 24  0430    148* 142   K 2.5* 3.4* 3.3*    109 109   CO2    PHOS 1.9*  --  2.5   BUN 8 8 7   CREATININE 1.1 1.0 0.9     Recent Labs     24  2345 24  0753 24  0430   WBC 23.1* 19.0* 12.7*   HGB 14.2 13.2 12.1   HCT 42.1 39.1 35.8*   .5* 106.1* 106.6*    321 281     No results found for: \"CKTOTAL\", \"CKMB\", \"CKMBINDEX\", \"TROPONINI\"  Estimated Creatinine  use     Ms. Jacome presented the hospital with recurrent VT.  She informs me today that she relapsed on Monday and used fentanyl (not sure if it was laced with something else) and continued her methadone. She presented with a very long Qtc >700 ms, hypokalemia went down to 2.3 and had PVCs.     Qtc is much better today. Discontinue lidocaine.   Continue to aggressively replete the potassium and keep it above 4  I strongly advised her that if she were to relapse and use drugs in the setting of methadone use or other OTC medications without notifying a practioner, she could die from VT  Troponin is elevated but is not suggestive of ischemia.  Nonemergent ischemic workup can be done prior to discharge with a stress test or as outpatient. She did not have any chest pain.  Continue amiodarone drip today and discontinue tomorrow.  Obtain an echocardiogram  Smoking cessation    Thank you for allowing me to participate in the care of this patient. If you have any questions, please do not hesitate to contact me.    Electronically signed by Herminio Whitlock MD on 11/22/2024 at 9:38 AM.    Herminio Whitlock MD  Cardiac Electrophysiology  [facility]

## 2024-11-24 NOTE — PROGRESS NOTES
w/ Reflex to MG    Collection Time: 11/24/24  4:30 AM   Result Value Ref Range    Sodium 142 136 - 145 mmol/L    Potassium reflex Magnesium 3.3 (L) 3.5 - 5.1 mmol/L    Chloride 109 99 - 110 mmol/L    CO2 22 21 - 32 mmol/L    Anion Gap 11 3 - 16    Glucose 125 (H) 70 - 99 mg/dL    BUN 7 7 - 20 mg/dL    Creatinine 0.9 0.6 - 1.1 mg/dL    Est, Glom Filt Rate 80 >60    Calcium 8.7 8.3 - 10.6 mg/dL   CBC with Auto Differential    Collection Time: 11/24/24  4:30 AM   Result Value Ref Range    WBC 12.7 (H) 4.0 - 11.0 K/uL    RBC 3.36 (L) 4.00 - 5.20 M/uL    Hemoglobin 12.1 12.0 - 16.0 g/dL    Hematocrit 35.8 (L) 36.0 - 48.0 %    .6 (H) 80.0 - 100.0 fL    MCH 36.0 (H) 26.0 - 34.0 pg    MCHC 33.7 31.0 - 36.0 g/dL    RDW 15.6 (H) 12.4 - 15.4 %    Platelets 281 135 - 450 K/uL    MPV 8.3 5.0 - 10.5 fL    Neutrophils % 74.7 %    Lymphocytes % 16.1 %    Monocytes % 8.9 %    Eosinophils % 0.1 %    Basophils % 0.2 %    Neutrophils Absolute 9.5 (H) 1.7 - 7.7 K/uL    Lymphocytes Absolute 2.0 1.0 - 5.1 K/uL    Monocytes Absolute 1.1 0.0 - 1.3 K/uL    Eosinophils Absolute 0.0 0.0 - 0.6 K/uL    Basophils Absolute 0.0 0.0 - 0.2 K/uL   Magnesium    Collection Time: 11/24/24  4:30 AM   Result Value Ref Range    Magnesium 2.03 1.80 - 2.40 mg/dL   Urine Drug Screen    Collection Time: 11/24/24  6:10 AM   Result Value Ref Range    Drug Screen Comment: see below      Other Labs    Imaging  CT ABDOMEN PELVIS W IV CONTRAST Additional Contrast? None   Final Result   1. No evidence of pulmonary embolism or acute pulmonary abnormality.   2. Large stool burden throughout the colon with large amount of rectal stool   burden in the distal sigmoid and rectum with induration of the perirectal   fat. Findings are compatible with stercoral colitis and fecal impaction.   3. Chronic biliary enlargement without evidence for obstructing mass or stone.         CT CHEST PULMONARY EMBOLISM W CONTRAST   Final Result   1. No evidence of pulmonary embolism or

## 2024-11-25 VITALS
SYSTOLIC BLOOD PRESSURE: 123 MMHG | BODY MASS INDEX: 18.03 KG/M2 | DIASTOLIC BLOOD PRESSURE: 72 MMHG | HEIGHT: 65 IN | OXYGEN SATURATION: 99 % | HEART RATE: 71 BPM | TEMPERATURE: 99.2 F | WEIGHT: 108.25 LBS | RESPIRATION RATE: 11 BRPM

## 2024-11-25 LAB
ANION GAP SERPL CALCULATED.3IONS-SCNC: 9 MMOL/L (ref 3–16)
BASOPHILS # BLD: 0 K/UL (ref 0–0.2)
BASOPHILS NFR BLD: 0.2 %
BUN SERPL-MCNC: 9 MG/DL (ref 7–20)
CALCIUM SERPL-MCNC: 8.8 MG/DL (ref 8.3–10.6)
CHLORIDE SERPL-SCNC: 108 MMOL/L (ref 99–110)
CO2 SERPL-SCNC: 21 MMOL/L (ref 21–32)
CREAT SERPL-MCNC: 0.9 MG/DL (ref 0.6–1.1)
DEPRECATED RDW RBC AUTO: 15.4 % (ref 12.4–15.4)
EKG ATRIAL RATE: 64 BPM
EKG DIAGNOSIS: NORMAL
EKG P AXIS: 69 DEGREES
EKG P-R INTERVAL: 124 MS
EKG Q-T INTERVAL: 558 MS
EKG QRS DURATION: 66 MS
EKG QTC CALCULATION (BAZETT): 575 MS
EKG R AXIS: 77 DEGREES
EKG T AXIS: 69 DEGREES
EKG VENTRICULAR RATE: 64 BPM
EOSINOPHIL # BLD: 0 K/UL (ref 0–0.6)
EOSINOPHIL NFR BLD: 0.2 %
GFR SERPLBLD CREATININE-BSD FMLA CKD-EPI: 80 ML/MIN/{1.73_M2}
GLUCOSE SERPL-MCNC: 147 MG/DL (ref 70–99)
HCT VFR BLD AUTO: 34 % (ref 36–48)
HGB BLD-MCNC: 11.7 G/DL (ref 12–16)
LYMPHOCYTES # BLD: 1.8 K/UL (ref 1–5.1)
LYMPHOCYTES NFR BLD: 18.2 %
MAGNESIUM SERPL-MCNC: 1.91 MG/DL (ref 1.8–2.4)
MCH RBC QN AUTO: 36.6 PG (ref 26–34)
MCHC RBC AUTO-ENTMCNC: 34.5 G/DL (ref 31–36)
MCV RBC AUTO: 106.2 FL (ref 80–100)
MONOCYTES # BLD: 0.7 K/UL (ref 0–1.3)
MONOCYTES NFR BLD: 7.2 %
NEUTROPHILS # BLD: 7.5 K/UL (ref 1.7–7.7)
NEUTROPHILS NFR BLD: 74.2 %
PHOSPHATE SERPL-MCNC: 2.3 MG/DL (ref 2.5–4.9)
PLATELET # BLD AUTO: 242 K/UL (ref 135–450)
PMV BLD AUTO: 8.1 FL (ref 5–10.5)
POTASSIUM SERPL-SCNC: 3.9 MMOL/L (ref 3.5–5.1)
RBC # BLD AUTO: 3.2 M/UL (ref 4–5.2)
SODIUM SERPL-SCNC: 138 MMOL/L (ref 136–145)
WBC # BLD AUTO: 10.1 K/UL (ref 4–11)

## 2024-11-25 PROCEDURE — 99232 SBSQ HOSP IP/OBS MODERATE 35: CPT | Performed by: INTERNAL MEDICINE

## 2024-11-25 PROCEDURE — 83735 ASSAY OF MAGNESIUM: CPT

## 2024-11-25 PROCEDURE — 6360000002 HC RX W HCPCS: Performed by: STUDENT IN AN ORGANIZED HEALTH CARE EDUCATION/TRAINING PROGRAM

## 2024-11-25 PROCEDURE — 6370000000 HC RX 637 (ALT 250 FOR IP): Performed by: STUDENT IN AN ORGANIZED HEALTH CARE EDUCATION/TRAINING PROGRAM

## 2024-11-25 PROCEDURE — 2580000003 HC RX 258: Performed by: NURSE PRACTITIONER

## 2024-11-25 PROCEDURE — 6370000000 HC RX 637 (ALT 250 FOR IP): Performed by: HOSPITALIST

## 2024-11-25 PROCEDURE — 6360000002 HC RX W HCPCS: Performed by: NURSE PRACTITIONER

## 2024-11-25 PROCEDURE — 2500000003 HC RX 250 WO HCPCS: Performed by: NURSE PRACTITIONER

## 2024-11-25 PROCEDURE — 6370000000 HC RX 637 (ALT 250 FOR IP): Performed by: INTERNAL MEDICINE

## 2024-11-25 PROCEDURE — 94760 N-INVAS EAR/PLS OXIMETRY 1: CPT

## 2024-11-25 PROCEDURE — 2580000003 HC RX 258: Performed by: HOSPITALIST

## 2024-11-25 PROCEDURE — 36592 COLLECT BLOOD FROM PICC: CPT

## 2024-11-25 PROCEDURE — 6360000002 HC RX W HCPCS: Performed by: HOSPITALIST

## 2024-11-25 PROCEDURE — 85025 COMPLETE CBC W/AUTO DIFF WBC: CPT

## 2024-11-25 PROCEDURE — 84100 ASSAY OF PHOSPHORUS: CPT

## 2024-11-25 PROCEDURE — 80048 BASIC METABOLIC PNL TOTAL CA: CPT

## 2024-11-25 PROCEDURE — 93010 ELECTROCARDIOGRAM REPORT: CPT | Performed by: INTERNAL MEDICINE

## 2024-11-25 PROCEDURE — 93005 ELECTROCARDIOGRAM TRACING: CPT | Performed by: INTERNAL MEDICINE

## 2024-11-25 RX ORDER — PROPRANOLOL HYDROCHLORIDE 60 MG/1
60 CAPSULE, EXTENDED RELEASE ORAL DAILY
Status: DISCONTINUED | OUTPATIENT
Start: 2024-11-25 | End: 2024-11-25 | Stop reason: HOSPADM

## 2024-11-25 RX ORDER — PROPRANOLOL HYDROCHLORIDE 60 MG/1
60 CAPSULE, EXTENDED RELEASE ORAL DAILY
Qty: 30 CAPSULE | Refills: 3 | Status: SHIPPED | OUTPATIENT
Start: 2024-11-26

## 2024-11-25 RX ORDER — NICOTINE 21 MG/24HR
1 PATCH, TRANSDERMAL 24 HOURS TRANSDERMAL DAILY
Status: DISCONTINUED | OUTPATIENT
Start: 2024-11-25 | End: 2024-11-25 | Stop reason: HOSPADM

## 2024-11-25 RX ORDER — POLYETHYLENE GLYCOL 3350 17 G/17G
17 POWDER, FOR SOLUTION ORAL 2 TIMES DAILY
Qty: 527 G | Refills: 1 | Status: SHIPPED | OUTPATIENT
Start: 2024-11-25 | End: 2024-12-25

## 2024-11-25 RX ORDER — MAGNESIUM SULFATE IN WATER 40 MG/ML
2000 INJECTION, SOLUTION INTRAVENOUS ONCE
Status: COMPLETED | OUTPATIENT
Start: 2024-11-25 | End: 2024-11-25

## 2024-11-25 RX ORDER — POLYETHYLENE GLYCOL 3350 17 G/17G
17 POWDER, FOR SOLUTION ORAL 2 TIMES DAILY
Status: DISCONTINUED | OUTPATIENT
Start: 2024-11-25 | End: 2024-11-25 | Stop reason: HOSPADM

## 2024-11-25 RX ADMIN — PIPERACILLIN AND TAZOBACTAM 3375 MG: 3; .375 INJECTION, POWDER, LYOPHILIZED, FOR SOLUTION INTRAVENOUS at 01:37

## 2024-11-25 RX ADMIN — KETOROLAC TROMETHAMINE 30 MG: 30 INJECTION, SOLUTION INTRAMUSCULAR at 08:34

## 2024-11-25 RX ADMIN — MAGNESIUM SULFATE HEPTAHYDRATE 2000 MG: 40 INJECTION, SOLUTION INTRAVENOUS at 08:44

## 2024-11-25 RX ADMIN — POLYETHYLENE GLYCOL 3350 17 G: 17 POWDER, FOR SOLUTION ORAL at 08:47

## 2024-11-25 RX ADMIN — POTASSIUM PHOSPHATE, MONOBASIC POTASSIUM PHOSPHATE, DIBASIC 15 MMOL: 224; 236 INJECTION, SOLUTION, CONCENTRATE INTRAVENOUS at 10:56

## 2024-11-25 RX ADMIN — PIPERACILLIN AND TAZOBACTAM 3375 MG: 3; .375 INJECTION, POWDER, LYOPHILIZED, FOR SOLUTION INTRAVENOUS at 09:33

## 2024-11-25 RX ADMIN — NALOXEGOL OXALATE 12.5 MG: 12.5 TABLET, FILM COATED ORAL at 07:41

## 2024-11-25 RX ADMIN — AMIODARONE HYDROCHLORIDE 0.5 MG/MIN: 50 INJECTION, SOLUTION INTRAVENOUS at 11:04

## 2024-11-25 ASSESSMENT — PAIN DESCRIPTION - ORIENTATION: ORIENTATION: MID

## 2024-11-25 ASSESSMENT — PAIN SCALES - GENERAL
PAINLEVEL_OUTOF10: 0
PAINLEVEL_OUTOF10: 6

## 2024-11-25 ASSESSMENT — PAIN - FUNCTIONAL ASSESSMENT: PAIN_FUNCTIONAL_ASSESSMENT: PREVENTS OR INTERFERES SOME ACTIVE ACTIVITIES AND ADLS

## 2024-11-25 ASSESSMENT — PAIN DESCRIPTION - LOCATION: LOCATION: ABDOMEN

## 2024-11-25 ASSESSMENT — PAIN DESCRIPTION - FREQUENCY: FREQUENCY: CONTINUOUS

## 2024-11-25 ASSESSMENT — PAIN DESCRIPTION - PAIN TYPE: TYPE: ACUTE PAIN

## 2024-11-25 ASSESSMENT — PAIN DESCRIPTION - ONSET: ONSET: ON-GOING

## 2024-11-25 ASSESSMENT — PAIN DESCRIPTION - DESCRIPTORS: DESCRIPTORS: CRAMPING

## 2024-11-25 NOTE — PLAN OF CARE
Problem: Discharge Planning  Goal: Discharge to home or other facility with appropriate resources  11/25/2024 1352 by Manjula Helms RN  Outcome: Adequate for Discharge  11/25/2024 0944 by Geovanna Brown RN  Outcome: Progressing  Flowsheets (Taken 11/25/2024 0944)  Discharge to home or other facility with appropriate resources: Arrange for needed discharge resources and transportation as appropriate     Problem: Safety - Adult  Goal: Free from fall injury  11/25/2024 1352 by Manjula Helms RN  Outcome: Adequate for Discharge  11/25/2024 0944 by Geovanna Brown RN  Outcome: Progressing  Flowsheets (Taken 11/25/2024 0944)  Free From Fall Injury: Instruct family/caregiver on patient safety     Problem: Skin/Tissue Integrity  Goal: Absence of new skin breakdown  Description: 1.  Monitor for areas of redness and/or skin breakdown  2.  Assess vascular access sites hourly  3.  Every 4-6 hours minimum:  Change oxygen saturation probe site  4.  Every 4-6 hours:  If on nasal continuous positive airway pressure, respiratory therapy assess nares and determine need for appliance change or resting period.  11/25/2024 1352 by Manjula Helms RN  Outcome: Adequate for Discharge  11/25/2024 0944 by Geovanna Brown RN  Outcome: Progressing     Problem: ABCDS Injury Assessment  Goal: Absence of physical injury  11/25/2024 1352 by Manjula Helms RN  Outcome: Adequate for Discharge  11/25/2024 0944 by Geovanna Brown RN  Outcome: Progressing  Flowsheets (Taken 11/25/2024 0944)  Absence of Physical Injury: Implement safety measures based on patient assessment     Problem: Pain  Goal: Verbalizes/displays adequate comfort level or baseline comfort level  11/25/2024 1352 by Manjula Helms RN  Outcome: Adequate for Discharge  11/25/2024 0944 by Geovanna Brown RN  Outcome: Progressing  Flowsheets (Taken 11/25/2024 0944)  Verbalizes/displays adequate comfort level or baseline comfort level:   Encourage patient to monitor  Manjula Helms RN  Outcome: Adequate for Discharge  11/25/2024 0944 by Geovanna Brown RN  Outcome: Progressing  Flowsheets (Taken 11/25/2024 0944)  Absence of urinary retention:   Assess patient’s ability to void and empty bladder   Monitor intake/output and perform bladder scan as needed     Problem: Metabolic/Fluid and Electrolytes - Adult  Goal: Electrolytes maintained within normal limits  11/25/2024 1352 by Manjula Helms RN  Outcome: Adequate for Discharge  11/25/2024 0944 by Geovanna Brown RN  Outcome: Progressing  Flowsheets (Taken 11/25/2024 0944)  Electrolytes maintained within normal limits: Monitor labs and assess patient for signs and symptoms of electrolyte imbalances  Goal: Hemodynamic stability and optimal renal function maintained  Outcome: Adequate for Discharge  Goal: Glucose maintained within prescribed range  Outcome: Adequate for Discharge     Problem: Hematologic - Adult  Goal: Maintains hematologic stability  Outcome: Adequate for Discharge

## 2024-11-25 NOTE — PROGRESS NOTES
Had a conversation with patient's sister regarding the Methadone with KRISHNA Beltran,that patient  had with her and she stated she already took it home.

## 2024-11-25 NOTE — PROGRESS NOTES
Report called to Manjula on 5N. Pt brought to room 5258 in stable condition via wheelchair with all of documented belongings. Pt's sister and 2 other family members at bedside.

## 2024-11-25 NOTE — DISCHARGE SUMMARY
V2.0  Discharge Summary    Name:  Lara Jacome /Age/Sex: 1980 (44 y.o. female)   Admit Date: 2024  Discharge Date: 24    MRN & CSN:  [id number] & [id number] Encounter Date and Time 24 12:18 PM EST    Attending:  Pk Hewitt DO Discharging Provider: Pk Hewitt DO       Hospital Course:     Brief HPI: Lara Jacome is a 44 y.o. female with a pertinent PMHx of opiate use disorder who presents to the ED complaining of constipation and syncope.  Patient reported that she had not had a bowel movement in approximately a month and has been straining to have a bowel movement and was losing consciousness during the straining episodes.  Originally they were concerned for vasovagal symptoms could be however she was noted to go into ventricular tachycardia and briefly lost a pulse while in the ED while straining.  She did receive 1 round of chest compressions with ROSC achieved.  EKG showed a significantly prolonged QTc interval of greater than 600 she was also significantly hypokalemic as well.  She was started on IV amiodarone as well as lidocaine drips and Cardiology was consulted.  Her electrolytes were aggressively replaced. Throughout her admission she had ventricular bigeminy which then converted to normal sinus rhythm.  She also had improvement in her QTc interval with holding methadone.  Amiodarone and lidocaine were discontinued and she was discharged with a Holter monitor with cardiology follow-up as an outpatient.  She was advised to avoid QT prolonging medications.  And if she were to resume her methadone she should resume at a lower dose.    For her constipation CT imaging showed concern for sterile coral colitis and fecal impaction.  She was started on IV Zosyn and gastroenterology was consulted.  She was started on aggressive bowel regimen with GoLytely which was poorly tolerated as well as receiving a dose of Relistor which was transitioned to Movantik.  She started having  frequent loose bowel movements and improvement in her abdominal pain and discomfort.  She is to follow-up with gastroenterology in the office.    Brief Problem Based Course:     Ventricular tachycardia  Prolonged Qtc  -Echo showed normal LV with EF 60 to 65% with normal RV  -S/p amiodarone and lidocaine drips  -QTc has improved and now on sinus rhythm, Holter monitor for 30 days with outpatient follow-up with Cardiology.    Fecal impaction  Opioid-induced constipation  -Movantik  -MiraLAX twice daily  -Outpatient follow-up with Gastroenterology    The patient expressed appropriate understanding of, and agreement with the discharge recommendations, medications, and plan.     Consults this admission:  IP CONSULT TO HOSPITALIST  IP CONSULT TO VASCULAR ACCESS TEAM  IP CONSULT TO GI  IP CONSULT TO CARDIOLOGY  IP CONSULT TO CRITICAL CARE  IP CONSULT TO CARDIOLOGY    Discharge Diagnosis:   Ventricular tachycardia (HCC)  Prolonged QT interval  Opioid-induced constipation    Discharge Instruction:   Follow up appointments: Cardiology, Gastroenterology  Primary care physician: No primary care provider on file. within 1 week  Diet: regular diet   Activity: activity as tolerated  Disposition: Discharged to:   [x]Home, []C, []SNF, []Acute Rehab, []Hospice   Condition on discharge: Stable  Labs and Tests to be Followed up as an outpatient by PCP or Specialist:     Discharge Medications:        Medication List        START taking these medications      naloxegol 12.5 MG Tabs tablet  Commonly known as: MOVANTIK  Take 1 tablet by mouth every morning (before breakfast)  Start taking on: November 26, 2024     polyethylene glycol 17 g packet  Commonly known as: GLYCOLAX  Take 1 packet by mouth 2 times daily            CONTINUE taking these medications      methadone 10 MG/5ML solution               Where to Get Your Medications        These medications were sent to [facility] [id number] [place] [id number] [facility]

## 2024-11-25 NOTE — PLAN OF CARE
Problem: Discharge Planning  Goal: Discharge to home or other facility with appropriate resources  Outcome: Progressing  Flowsheets (Taken 11/25/2024 0944)  Discharge to home or other facility with appropriate resources: Arrange for needed discharge resources and transportation as appropriate     Problem: Safety - Adult  Goal: Free from fall injury  11/25/2024 0944 by Geovanna Brown, RN  Outcome: Progressing  Flowsheets (Taken 11/25/2024 0944)  Free From Fall Injury: Instruct family/caregiver on patient safety     Problem: Skin/Tissue Integrity  Goal: Absence of new skin breakdown  Description: 1.  Monitor for areas of redness and/or skin breakdown  2.  Assess vascular access sites hourly  3.  Every 4-6 hours minimum:  Change oxygen saturation probe site  4.  Every 4-6 hours:  If on nasal continuous positive airway pressure, respiratory therapy assess nares and determine need for appliance change or resting period.  11/25/2024 0944 by Geovanna Brown RN  Outcome: Progressing     Problem: ABCDS Injury Assessment  Goal: Absence of physical injury  11/25/2024 0944 by Geovanna Brown, RN  Outcome: Progressing  Flowsheets (Taken 11/25/2024 0944)  Absence of Physical Injury: Implement safety measures based on patient assessment     Problem: Pain  Goal: Verbalizes/displays adequate comfort level or baseline comfort level  11/25/2024 0944 by Geovanna Brown, RN  Outcome: Progressing  Flowsheets (Taken 11/25/2024 0944)  Verbalizes/displays adequate comfort level or baseline comfort level:   Encourage patient to monitor pain and request assistance   Assess pain using appropriate pain scale     Problem: Respiratory - Adult  Goal: Achieves optimal ventilation and oxygenation  11/25/2024 0944 by Geovanna Brown, RN  Outcome: Progressing  Flowsheets (Taken 11/25/2024 0944)  Achieves optimal ventilation and oxygenation:   Assess for changes in mentation and behavior   Assess for changes in respiratory status

## 2024-11-25 NOTE — PLAN OF CARE
Problem: Safety - Adult  Goal: Free from fall injury  11/24/2024 2158 by Elenita Michele RN  Outcome: Progressing  Flowsheets (Taken 11/24/2024 2000)  Free From Fall Injury: Instruct family/caregiver on patient safety     Problem: ABCDS Injury Assessment  Goal: Absence of physical injury  11/24/2024 2158 by Elenita Michele RN  Outcome: Progressing  Flowsheets (Taken 11/24/2024 2000)  Absence of Physical Injury: Implement safety measures based on patient assessment     Problem: Respiratory - Adult  Goal: Achieves optimal ventilation and oxygenation  11/24/2024 2158 by Elenita Michele RN  Outcome: Progressing  Flowsheets (Taken 11/24/2024 2000)  Achieves optimal ventilation and oxygenation:   Assess for changes in respiratory status   Assess for changes in mentation and behavior   Encourage broncho-pulmonary hygiene including cough, deep breathe, incentive spirometry   Assess and instruct to report shortness of breath or any respiratory difficulty    Problem: Cardiovascular - Adult  Goal: Maintains optimal cardiac output and hemodynamic stability  11/24/2024 2158 by Elenita Michele RN  Outcome: Progressing  Flowsheets (Taken 11/24/2024 2000)  Maintains optimal cardiac output and hemodynamic stability:   Monitor blood pressure and heart rate   Assess for signs of decreased cardiac output     Problem: Cardiovascular - Adult  Goal: Absence of cardiac dysrhythmias or at baseline  11/24/2024 2158 by Elenita Michele RN  Outcome: Progressing  Flowsheets (Taken 11/24/2024 2000)  Absence of cardiac dysrhythmias or at baseline:   Monitor cardiac rate and rhythm   Assess for signs of decreased cardiac output   Administer antiarrhythmia medication and electrolyte replacement as ordered     Problem: Skin/Tissue Integrity - Adult  Goal: Skin integrity remains intact  11/24/2024 2158 by Elenita Michele RN  Outcome: Progressing  Flowsheets (Taken 11/24/2024 2000)  Skin Integrity Remains Intact:   Monitor for areas of  replacements, including repeat lab results as appropriate     Problem: Metabolic/Fluid and Electrolytes - Adult  Goal: Hemodynamic stability and optimal renal function maintained  11/24/2024 2158 by Elenita Michele, RN  Outcome: Progressing  Flowsheets (Taken 11/24/2024 2000)  Hemodynamic stability and optimal renal function maintained:   Monitor labs and assess for signs and symptoms of volume excess or deficit   Monitor intake, output and patient weight   Monitor response to interventions for patient's volume status, including labs, urine output, blood pressure (other measures as available)   Encourage oral intake as appropriate     Problem: Hematologic - Adult  Goal: Maintains hematologic stability  11/24/2024 2158 by Elenita Michele, RN  Outcome: Progressing  Flowsheets (Taken 11/24/2024 2000)  Maintains hematologic stability:   Assess for signs and symptoms of bleeding or hemorrhage   Monitor labs for bleeding or clotting disorders

## 2024-11-25 NOTE — DISCHARGE INSTR - COC
Continuity of Care Form    Patient Name: Lara Jacome   :  1980  MRN:  [id number]    Admit date:  2024  Discharge date:  ***    Code Status Order: Full Code   Advance Directives:   Advance Care Flowsheet Documentation             Admitting Physician:  Matthew Pierson MD  PCP: No primary care provider on file.    Discharging Nurse: ***  Discharging Hospital Unit/Room#: R5Y-6346/2115-01  Discharging Unit Phone Number: ***    Emergency Contact:   Extended Emergency Contact Information  Primary Emergency Contact: TobiasLucia           [place], [place]  Home Phone: [phone]  Relation: Parent  Secondary Emergency Contact: Aubrie Jacome  Mobile Phone: [phone]  Relation: Brother/Sister    Past Surgical History:  History reviewed. No pertinent surgical history.    Immunization History:     There is no immunization history on file for this patient.    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    Syncope and collapse R55    Cardiac arrest I46.9       Isolation/Infection:   Isolation            No Isolation          Patient Infection Status       None to display            Nurse Assessment:  Last Vital Signs: BP (!) 144/83   Pulse 77   Temp 98.1 °F (36.7 °C) (Oral)   Resp 18   Ht 1.651 m (5' 5\")   Wt 49.1 kg (108 lb 3.9 oz)   LMP 10/31/2024 (Approximate)   SpO2 100%   BMI 18.01 kg/m²     Last documented pain score (0-10 scale): Pain Level: 0  Last Weight:   Wt Readings from Last 1 Encounters:   24 49.1 kg (108 lb 3.9 oz)     Mental Status:  {IP PT MENTAL STATUS:33500}    IV Access:  { DARNELL IV ACCESS:706507560}    Nursing Mobility/ADLs:  Walking   {CHP DME ADLs:078856710}  Transfer  {CHP DME ADLs:236124410}  Bathing  {CHP DME  ADLs:317700757}  Dressing  {CHP DME ADLs:666481613}  Toileting  {CHP DME ADLs:975993467}  Feeding  {CHP DME ADLs:094347700}  Med Admin  {P DME ADLs:567091990}  Med Delivery   { DARNELL MED Delivery:914809184}    Wound Care Documentation and Therapy:        Elimination:  Continence:   Bowel: {YES / NO:}  Bladder: {YES / NO:}  Urinary Catheter: {Urinary Catheter:054274358}   Colostomy/Ileostomy/Ileal Conduit: {YES / NO:}       Date of Last BM: ***    Intake/Output Summary (Last 24 hours) at 2024 1212  Last data filed at 2024 0750  Gross per 24 hour   Intake 647.4 ml   Output --   Net 647.4 ml     I/O last 3 completed shifts:  In: 1870.8 [P.O.:250; I.V.:837.8; IV Piggyback:783]  Out: -     Safety Concerns:     { DARNELL Safety Concerns:194039126}    Impairments/Disabilities:      { DARNELL Impairments/Disabilities:033177079}    Nutrition Therapy:  Current Nutrition Therapy:   { DARNELL Diet List:753481048}    Routes of Feeding: {[facility] Other Feedings:075979940}  Liquids: {Slp liquid thickness:34770}  Daily Fluid Restriction: {[facility] DME Yes amt example:425113133}  Last Modified Barium Swallow with Video (Video Swallowing Test): {Done Not Done Date:}    Treatments at the Time of Hospital Discharge:   Respiratory Treatments: ***  Oxygen Therapy:  {Therapy; copd oxygen:22917}  Ventilator:    { CC Vent List:432340384}    Rehab Therapies: {THERAPEUTIC INTERVENTION:0141474608}  Weight Bearing Status/Restrictions: {[facility] Weight Bearin}  Other Medical Equipment (for information only, NOT a DME order):  {EQUIPMENT:349282004}  Other Treatments: ***    Patient's personal belongings (please select all that are sent with patient):  {[facility] DME Belongings:401247311}    RN SIGNATURE:  {Esignature:802179893}    CASE MANAGEMENT/SOCIAL WORK SECTION    Inpatient Status Date: ***    Readmission Risk Assessment Score:  Readmission Risk              Risk of Unplanned Readmission:  14

## 2024-11-25 NOTE — PROGRESS NOTES
is not covered, could also consider lubiprostone which is now generic.  Would use 24 mcg twice daily.  Will sign off.  Please call with questions.    Thank you for allowing me to participate in the care of your patient.  Please feel free to contact me with any concerns.  [phone]    Jose Ozuna MD

## 2024-11-25 NOTE — DISCHARGE INSTRUCTIONS
You had an irregular heartbeat which was likely related to having a prolonged QT interval.  Methadone can prolong the QT interval.  It is very important that you either stop or reduce the dose of her methadone and should follow-up with your prescribing provider soon as possible.  You should also avoid other medications that could potentially prolong QTc such as antibiotics such as fluoroquinolones.  
none...

## 2024-11-25 NOTE — PROGRESS NOTES
Discharge orders acknowledged by RN . Discharge teaching completed with pt and family. AVS reviewed and all questions answered. Medication regimen reviewed and pt understands schedule. Follow up appointments also reviewed with pt and resources given for discharge. Pt meds sent electronic to be filled and understands schedule. PICC removed. Bedside monitor removed from pt. 60+ minutes of education completed. Required core measures completed. Pt vitals WDL. Pt discharged with all belongings to home with sister. Pt transported off of unit via wheelchair. No complications.      Electronically signed by PAUL LICONA RN on 11/25/2024 at 4:49 PM

## 2024-11-25 NOTE — PROGRESS NOTES
NAME:  Lara Jacome  YOB: 1980  MEDICAL RECORD NUMBER:  [id number]    Shift Summary: Amio gtt stopped. Pt expected to discharge later today.     Family updated: Yes; pt's sister at bedside    Rhythm: Normal Sinus Rhythm     Most recent vitals:   Visit Vitals  /78   Pulse 70   Temp 98 °F (36.7 °C) (Oral)   Resp 11   Ht 1.651 m (5' 5\")   Wt 49.1 kg (108 lb 3.9 oz)   SpO2 100%   BMI 18.01 kg/m²           No data found.    No data found.      Respiratory support needed (if any):  - RA    Admission weight Weight - Scale: 55 kg (121 lb 4.1 oz) (11/22/24 2395)    Today's weight    Wt Readings from Last 1 Encounters:   11/25/24 49.1 kg (108 lb 3.9 oz)        Zhou need assessed each shift: N/A - no zhou present  UOP >30ml/hr: YES  Last documented BM (in last 48 hrs):  Patient Vitals for the past 48 hrs:   Last BM (including prior to admit) Stool Occurrence   11/23/24 1600 11/23/24 --   11/23/24 2000 11/23/24 --   11/23/24 2200 11/23/24 1   11/24/24 0600 11/24/24 1   11/24/24 0800 11/24/24 --   11/24/24 1400 11/24/24 --   11/24/24 2000 11/24/24 --   11/24/24 2200 11/24/24 1                Restraints (in use currently or dc'd in last 12 hrs): No    Order current and documentation up to date? No    Lines/Drains reviewed @ bedside.  PICC 11/23/24 Right Brachial (Active)   Number of days: 2         Drip rates at handoff:    amiodarone 0.5 mg/min (11/25/24 1104)    lidocaine Stopped (11/24/24 1125)    sodium chloride         Lab Data:   CBC:   Recent Labs     11/24/24  0430 11/25/24  0405   WBC 12.7* 10.1   HGB 12.1 11.7*   HCT 35.8* 34.0*   .6* 106.2*    242     BMP:    Recent Labs     11/24/24  0430 11/25/24  0405    138   K 3.3* 3.9   CO2 22 21   BUN 7 9   CREATININE 0.9 0.9     LIVR:   Recent Labs     11/22/24  2345 11/24/24  1000   AST 28 20   ALT <5* 10     PT/INR: No results for input(s): \"INR\" in the last 72 hours.    Invalid input(s): \"PROT\"  APTT: No results for input(s):  \"APTT\" in the last 72 hours.  ABG: No results for input(s): \"PHART\", \"SAX0LAS\", \"PO2ART\" in the last 72 hours.    Any consults during the shift? No    Any signed and held orders to be released?  No        4 Eyes Skin Assessment       The patient is being assessed for  Shift Handoff    I agree that at least one RN has performed a thorough Head to Toe Skin Assessment on the patient. ALL assessment sites listed below have been assessed.      Areas assessed by both nurses: Head, Face, Ears, Shoulders, Back, Chest, Arms, Elbows, Hands, Sacrum. Buttock, Coccyx, Ischium, Legs. Feet and Heels, and Under Medical Devices         Does the Patient have a Wound? No noted wound(s)    Wound Care Orders initiated by RN: No       Irvin Prevention initiated by RN: Yes    Pressure Injury (Stage 3,4, Unstageable, DTI, NWPT, and Complex wounds) if present, place Wound referral order by RN under : No    New Ostomies, if present place, Ostomy referral order under : No     Nurse 1 eSignature: Electronically signed by Geovanna Brown RN on 11/25/24 at 1:06 PM EST    **SHARE this note so that the co-signing nurse can place an eSignature**    Nurse 2 eSignature: {Esignature:[id number]}

## 2024-11-25 NOTE — PROGRESS NOTES
NAME:  Lara Jacome  YOB: 1980  MEDICAL RECORD NUMBER:  [id number]    Shift Summary: Remains on Sinus rhythm with occasional bigeminy. Refusing to drink GoLYTELY. Just a smear BM overnight. No complaint of nausea. COWS every 4 hours, score of 0. No bowel movement overnight.    Family updated: Yes:  sister at bedside    Rhythm: Normal Sinus Rhythm with PVCs    Most recent vitals:   Visit Vitals  BP (!) 144/70   Pulse 69   Temp 98.4 °F (36.9 °C) (Axillary)   Resp 18   Ht 1.651 m (5' 5\")   Wt 49.1 kg (108 lb 3.9 oz)   SpO2 99%   BMI 18.01 kg/m²           No data found.    No data found.      Respiratory support needed (if any):  - RA    Admission weight Weight - Scale: 55 kg (121 lb 4.1 oz) (11/22/24 2065)    Today's weight    Wt Readings from Last 1 Encounters:   11/25/24 49.1 kg (108 lb 3.9 oz)        Zhou need assessed each shift: N/A - no zhou present  UOP >30ml/hr: ANABEL, incontinent  Last documented BM (in last 48 hrs):  Patient Vitals for the past 48 hrs:   Last BM (including prior to admit) Stool Occurrence   11/23/24 0800 11/23/24 --   11/23/24 1000 11/23/24 --   11/23/24 1200 11/23/24 --   11/23/24 1600 11/23/24 --   11/23/24 2000 11/23/24 --   11/23/24 2200 11/23/24 1   11/24/24 0600 11/24/24 1   11/24/24 0800 11/24/24 --   11/24/24 1400 11/24/24 --   11/24/24 2000 11/24/24 --   11/24/24 2200 11/24/24 1                Restraints (in use currently or dc'd in last 12 hrs): No    Order current and documentation up to date? No    Lines/Drains reviewed @ bedside.  PICC 11/23/24 Right Brachial (Active)   Number of days: 1         Drip rates at handoff:    amiodarone 0.5 mg/min (11/24/24 2234)    lidocaine Stopped (11/24/24 1125)    sodium chloride         Lab Data:   CBC:   Recent Labs     11/24/24  0430 11/25/24  0405   WBC 12.7* 10.1   HGB 12.1 11.7*   HCT 35.8* 34.0*   .6* 106.2*    242     BMP:    Recent Labs     11/24/24  0430 11/25/24  0405    138   K 3.3* 3.9   CO2 22 21    BUN 7 9   CREATININE 0.9 0.9     LIVR:   Recent Labs     11/22/24  2345 11/24/24  1000   AST 28 20   ALT <5* 10     PT/INR: No results for input(s): \"INR\" in the last 72 hours.    Invalid input(s): \"PROT\"  APTT: No results for input(s): \"APTT\" in the last 72 hours.  ABG: No results for input(s): \"PHART\", \"ORK5UQY\", \"PO2ART\" in the last 72 hours.    Any consults during the shift? No    Any signed and held orders to be released?  No        4 Eyes Skin Assessment       The patient is being assessed for  Shift Handoff    I agree that at least one RN has performed a thorough Head to Toe Skin Assessment on the patient. ALL assessment sites listed below have been assessed.      Areas assessed by both nurses: Head, Face, Ears, Shoulders, Back, Chest, Arms, Elbows, Hands, Sacrum. Buttock, Coccyx, Ischium, Legs. Feet and Heels, and Under Medical Devices         Does the Patient have a Wound? No noted wound(s)    Wound Care Orders initiated by RN: No       Irvin Prevention initiated by RN: Yes    Pressure Injury (Stage 3,4, Unstageable, DTI, NWPT, and Complex wounds) if present, place Wound referral order by RN under : No    New Ostomies, if present place, Ostomy referral order under : No     Nurse 1 eSignature: Electronically signed by Elenita Michele RN on 11/25/24 at 7:52 AM EST    **SHARE this note so that the co-signing nurse can place an eSignature**    Nurse 2 eSignature: {Esignature:[id number]}

## 2024-11-25 NOTE — PROGRESS NOTES
Comprehensive Nutrition Assessment    Type and Reason for Visit:  Initial, Positive nutrition screen (decreased appetite, weight loss)    Nutrition Recommendations/Plan:   ***     Malnutrition Assessment:  Malnutrition Status:       Context:  Chronic Illness     Findings of the 6 clinical characteristics of malnutrition:  {Malnutrition Characteristics:95865}    Nutrition Assessment:    Patient admitted with ventricular tachycardia.  Currently in ICU.  NPO yesterday on 11/24, Chronic constipation noted, GI consulted.  Diet advanced to regular on 11/25.  Patient did have large BM in the past 24 hours.  Nutrition risk triggered due to weight loss and decreased appetite prior to admission.  Weight was 126 lbs back in 2019 per EMR.    Nutrition Related Findings:    Phos 2.3 on 11/25; BM on 11/24-on bowel regimen Wound Type:  (redness on rectum)       Current Nutrition Intake & Therapies:    Average Meal Intake: NPO  Average Supplements Intake: NPO  {Current Nutrition Therapy:29749}    Anthropometric Measures:  Height: 165.1 cm (5' 5\")  Ideal Body Weight (IBW): 125 lbs (57 kg)       Current Body Weight: 49.1 kg (108 lb 3.9 oz),   IBW. Weight Source: Bed scale  Current BMI (kg/m2): 18                             BMI Categories: Underweight (BMI less than 18.5)    Estimated Daily Nutrient Needs:  Energy Requirements Based On: Kcal/kg  Weight Used for Energy Requirements: Current  Energy (kcal/day): 5338-0313 (35-40 kcal/49.1 kg)  Weight Used for Protein Requirements: Current  Protein (g/day): 74-88 (1.5-1.8 g/49.1 kg)  Method Used for Fluid Requirements: 1 ml/kcal  Fluid (ml/day):      Nutrition Diagnosis:   {PES Statements:52756}    Nutrition Interventions:   Food and/or Nutrient Delivery: Start Oral Diet, Start Oral Nutrition Supplement (when medically able)     Coordination of Nutrition Care: Continue to monitor while inpatient       Goals:  Goals: Initiation of nutrition, within 2 days  Type of Goal: New goal

## 2024-11-25 NOTE — PROGRESS NOTES
[facility]   Electrophysiology Follow up     Date: 2024  Reason for Consultation: Cardiac arrest long QT  Consult Requesting Physician: Pk Hewitt DO     S:   - Telemetry reviewed, no further episodes of NSVT, continues to have intermittent PVCs and prolonged QT interval    Physical Examination:  /71   Pulse 67   Temp 98.3 °F (36.8 °C) (Oral)   Resp 16   Ht 1.651 m (5' 5\")   Wt 49.1 kg (108 lb 3.9 oz)   LMP 10/31/2024 (Approximate)   SpO2 99%   BMI 18.01 kg/m²   Temp  Av.3 °F (36.8 °C)  Min: 98.2 °F (36.8 °C)  Max: 98.4 °F (36.9 °C)  Pulse  Av  Min: 61  Max: 90  BP  Min: 93/52  Max: 147/72  SpO2  Av.9 %  Min: 94 %  Max: 100 %    Intake/Output Summary (Last 24 hours) at 2024 0842  Last data filed at 2024 0750  Gross per 24 hour   Intake 1320.82 ml   Output --   Net 1320.82 ml     No acute distress  Chest clear, nonlabored, no rhonchi or wheeze  Heart is regular rate, regular rhythm, no murmurs, no lower extremity swelling, no jugular venous distention  Abdomen is rounded, soft, nontender  Strength is grossly preserved, normal tone  No focal neurologic deficits    Labs:    Lab Results   Component Value Date/Time     2024 04:05 AM    K 3.9 2024 04:05 AM     2024 04:05 AM    CO2 21 2024 04:05 AM    BUN 9 2024 04:05 AM    CREATININE 0.9 2024 04:05 AM    GLUCOSE 147 2024 04:05 AM    CALCIUM 8.8 2024 04:05 AM      No results found for: \"TSH\", \"TSHFT4\", \"TSHELE\", \"PAQ1NDE\", \"TSHHS\"  Lab Results   Component Value Date/Time    ALKPHOS 48 2024 10:00 AM    ALT 10 2024 10:00 AM    AST 20 2024 10:00 AM    BILITOT 0.5 2024 10:00 AM    BILIDIR 0.3 2024 10:00 AM      Lab Results   Component Value Date    TROPHS 47 (H) 2024        EKG 2024  Sinus rhythm   Prolonged QT 697ms  Premature ventricular complexes    ECHO    Left Ventricle: Normal left ventricular systolic  MD Sharon  [facility]   Office: [phone]  Fax: [phone]

## 2024-12-02 NOTE — PROGRESS NOTES
Physician Progress Note      PATIENT:               KIKO CURRAN  CSN #:                  [id number]  :                       1980  ADMIT DATE:       2024 11:15 PM  DISCH DATE:        2024 4:56 PM  RESPONDING  PROVIDER #:        Pk Hewitt DO          QUERY TEXT:    Internal Medicine,    Patient admitted with cardiac arrest r/t V-tach and has troponin elevation.    If possible, please document in the progress notes and discharge summary if   you are evaluating and/or treating any of the following:    The medical record reflects the following:  Risk Factors: cardiac arrest, V-tach  Clinical Indicators:  troponin 27 on admit with rise to 47 on repeat,   recurrent v-tach,  eKG shows nonspecofoc T wave abnormalities and prolonged   QT, ED documents patient c/o chest pain following syncopal episode per   documentation patient received CPR by family member.  Treatment:  labs, imaging, EP consult, lidocaine, CPR, ECHO, medical   management    Thank you  Options provided:  -- Type 2 MI  -- Demand Ischemia with MI  -- Other - I will add my own diagnosis  -- Disagree - Not applicable / Not valid  -- Disagree - Clinically unable to determine / Unknown  -- Refer to Clinical Documentation Reviewer    PROVIDER RESPONSE TEXT:    This patient has a Type 2 MI.    Query created by: Fiorella Merlos on 2024 10:51 AM      Electronically signed by:  Pk Hewitt DO 2024 6:40 PM

## 2025-01-31 ENCOUNTER — TELEPHONE (OUTPATIENT)
Dept: CARDIOLOGY CLINIC | Age: 45
End: 2025-01-31

## 2025-01-31 NOTE — TELEPHONE ENCOUNTER
Placed on schedule for 02/18/2024 at 845 am. Please call patient to make sure appointment date and time work for her.

## 2025-01-31 NOTE — PROGRESS NOTES
[facility]   Electrophysiology Follow up     Date: 2/18/2025  I had the privilege of visiting Lara Jacome in the office.     CC: Follow up Cardiac arrest.     HPI: Lara Jacome is a 44 y.o. 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  Discharged with 30-day monitor which revealed short runs of atrial tachycardia, no NSVT/VT and less than 1% ectopy burden.    Patient presents to the office today for the follow-up of prolonged QT and cardiac arrest.  She has been compliant with propranolol, has intermittent chest discomfort that she felt was relieved by propranolol.  Denies dizziness/lightheadedness or syncope.    Review of System:  [x] Full ROS obtained and negative except as mentioned in HPI      Prior to Admission medications    Medication Sig Start Date End Date Taking? Authorizing Provider   naloxegol (MOVANTIK) 12.5 MG TABS tablet Take 1 tablet by mouth every morning (before breakfast) 11/26/24  Yes Pk Hewitt I, DO   propranolol (INDERAL LA) 60 MG extended release capsule Take 1 capsule by mouth daily 11/26/24  Yes Tom Herrera MD   methadone 10 MG/5ML solution Take 40 mLs by mouth daily.   Yes Provider, MD Micaela       History reviewed. No pertinent past medical history.     No past surgical history on file.    Allergies   Allergen Reactions    Flagyl [Metronidazole] Rash       Social History:  Reviewed.  reports that she has been smoking cigarettes. She started smoking about 13 months ago. She has a 1.1

## 2025-02-18 ENCOUNTER — TELEPHONE (OUTPATIENT)
Dept: CARDIOLOGY CLINIC | Age: 45
End: 2025-02-18

## 2025-02-18 ENCOUNTER — OFFICE VISIT (OUTPATIENT)
Dept: CARDIOLOGY CLINIC | Age: 45
End: 2025-02-18

## 2025-02-18 VITALS
BODY MASS INDEX: 20.03 KG/M2 | HEIGHT: 65 IN | WEIGHT: 120.2 LBS | DIASTOLIC BLOOD PRESSURE: 40 MMHG | HEART RATE: 55 BPM | OXYGEN SATURATION: 98 % | SYSTOLIC BLOOD PRESSURE: 90 MMHG

## 2025-02-18 DIAGNOSIS — I47.20 VENTRICULAR TACHYCARDIA (HCC): Primary | ICD-10-CM

## 2025-02-18 DIAGNOSIS — R94.31 PROLONGED QT INTERVAL: ICD-10-CM

## 2025-02-18 RX ORDER — PROPRANOLOL HYDROCHLORIDE 60 MG/1
60 CAPSULE, EXTENDED RELEASE ORAL DAILY
Qty: 90 CAPSULE | Refills: 3 | Status: SHIPPED | OUTPATIENT
Start: 2025-02-18

## 2025-02-18 NOTE — TELEPHONE ENCOUNTER
Lara states that she ended up getting her children to school late due to coming to appt today. Her children's school is requesting an excuse letter from [employer] stating that Lara was seen today or the school won't allow her children to be in school today. Lara shared that she tried to show them her AVS but the school did not accept that. Lara is asking if excuse note could be faxed to [phone].    Please advise    Lara's callback: [phone]

## 2025-02-18 NOTE — PATIENT INSTRUCTIONS
You have prolonged QT.     A prolonged QT interval refers to an abnormally long duration of the QT segment on an electrocardiogram (ECG). The QT segment represents the time it takes for the heart's lower chambers (ventricles) to repolarize, or return to their resting state, after a heartbeat.      You will need to tell any doctors when they want to start you on new medication as you need to avoid medications which can cause prolonged QT.  Also if you are planning on taking any over-the-counter medications please check with physician prior to starting.

## 2025-03-14 ENCOUNTER — HOSPITAL ENCOUNTER (EMERGENCY)
Age: 45
Discharge: HOME | End: 2025-03-14
Payer: COMMERCIAL

## 2025-03-14 ENCOUNTER — APPOINTMENT (OUTPATIENT)
Dept: GENERAL RADIOLOGY | Age: 45
End: 2025-03-14
Payer: COMMERCIAL

## 2025-03-14 VITALS
SYSTOLIC BLOOD PRESSURE: 117 MMHG | OXYGEN SATURATION: 93 % | DIASTOLIC BLOOD PRESSURE: 78 MMHG | HEART RATE: 55 BPM | BODY MASS INDEX: 20.17 KG/M2 | WEIGHT: 121.03 LBS | HEIGHT: 65 IN | RESPIRATION RATE: 18 BRPM | TEMPERATURE: 98.1 F

## 2025-03-14 DIAGNOSIS — S69.92XA HAND INJURIES, LEFT, INITIAL ENCOUNTER: ICD-10-CM

## 2025-03-14 DIAGNOSIS — S50.12XA CONTUSION OF LEFT FOREARM, INITIAL ENCOUNTER: ICD-10-CM
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
## 2025-03-14 DIAGNOSIS — R07.9 CHEST PAIN, UNSPECIFIED TYPE: Primary | ICD-10-CM

## 2025-03-14 LAB
ALBUMIN SERPL-MCNC: 3.9 G/DL (ref 3.4–5)
ALBUMIN/GLOB SERPL: 1.2 {RATIO} (ref 1.1–2.2)
ALP SERPL-CCNC: 95 U/L (ref 40–129)
ALT SERPL-CCNC: <5 U/L (ref 10–40)
ANION GAP SERPL CALCULATED.3IONS-SCNC: 10 MMOL/L (ref 3–16)
AST SERPL-CCNC: 14 U/L (ref 15–37)
BASOPHILS # BLD: 0.1 K/UL (ref 0–0.2)
BASOPHILS NFR BLD: 1.2 %
BILIRUB SERPL-MCNC: <0.2 MG/DL (ref 0–1)
BUN SERPL-MCNC: 8 MG/DL (ref 7–20)
CALCIUM SERPL-MCNC: 9.2 MG/DL (ref 8.3–10.6)
CHLORIDE SERPL-SCNC: 102 MMOL/L (ref 99–110)
CO2 SERPL-SCNC: 26 MMOL/L (ref 21–32)
CREAT SERPL-MCNC: 1 MG/DL (ref 0.6–1.1)
DEPRECATED RDW RBC AUTO: 15.4 % (ref 12.4–15.4)
EKG ATRIAL RATE: 54 BPM
EKG DIAGNOSIS: NORMAL
EKG P AXIS: 73 DEGREES
EKG P-R INTERVAL: 140 MS
EKG Q-T INTERVAL: 472 MS
EKG QRS DURATION: 70 MS
EKG QTC CALCULATION (BAZETT): 447 MS
EKG R AXIS: 79 DEGREES
EKG T AXIS: 75 DEGREES
EKG VENTRICULAR RATE: 54 BPM
EOSINOPHIL # BLD: 0.1 K/UL (ref 0–0.6)
EOSINOPHIL NFR BLD: 1.2 %
GFR SERPLBLD CREATININE-BSD FMLA CKD-EPI: 71 ML/MIN/{1.73_M2}
GLUCOSE SERPL-MCNC: 78 MG/DL (ref 70–99)
HCT VFR BLD AUTO: 41.9 % (ref 36–48)
HGB BLD-MCNC: 14.2 G/DL (ref 12–16)
LYMPHOCYTES # BLD: 3.6 K/UL (ref 1–5.1)
LYMPHOCYTES NFR BLD: 31.4 %
MCH RBC QN AUTO: 37.4 PG (ref 26–34)
MCHC RBC AUTO-ENTMCNC: 33.9 G/DL (ref 31–36)
MCV RBC AUTO: 110.5 FL (ref 80–100)
MONOCYTES # BLD: 0.7 K/UL (ref 0–1.3)
MONOCYTES NFR BLD: 6.1 %
NEUTROPHILS # BLD: 7 K/UL (ref 1.7–7.7)
NEUTROPHILS NFR BLD: 60.1 %
PATH INTERP BLD-IMP: NORMAL
PATH INTERP BLD-IMP: YES
PLATELET # BLD AUTO: 410 K/UL (ref 135–450)
PMV BLD AUTO: 6.9 FL (ref 5–10.5)
POTASSIUM SERPL-SCNC: 3.7 MMOL/L (ref 3.5–5.1)
PROT SERPL-MCNC: 7.1 G/DL (ref 6.4–8.2)
RBC # BLD AUTO: 3.79 M/UL (ref 4–5.2)
SODIUM SERPL-SCNC: 138 MMOL/L (ref 136–145)
TROPONIN, HIGH SENSITIVITY: <6 NG/L (ref 0–14)
WBC # BLD AUTO: 11.6 K/UL (ref 4–11)

## 2025-03-14 PROCEDURE — 71046 X-RAY EXAM CHEST 2 VIEWS: CPT

## 2025-03-14 PROCEDURE — 80053 COMPREHEN METABOLIC PANEL: CPT

## 2025-03-14 PROCEDURE — 93010 ELECTROCARDIOGRAM REPORT: CPT | Performed by: INTERNAL MEDICINE

## 2025-03-14 PROCEDURE — 84484 ASSAY OF TROPONIN QUANT: CPT

## 2025-03-14 PROCEDURE — 73070 X-RAY EXAM OF ELBOW: CPT

## 2025-03-14 PROCEDURE — 93005 ELECTROCARDIOGRAM TRACING: CPT | Performed by: STUDENT IN AN ORGANIZED HEALTH CARE EDUCATION/TRAINING PROGRAM

## 2025-03-14 PROCEDURE — 73130 X-RAY EXAM OF HAND: CPT

## 2025-03-14 PROCEDURE — 99285 EMERGENCY DEPT VISIT HI MDM: CPT

## 2025-03-14 PROCEDURE — 85025 COMPLETE CBC W/AUTO DIFF WBC: CPT

## 2025-03-14 RX ORDER — LIDOCAINE 50 MG/G
1 PATCH TOPICAL EVERY 24 HOURS
Qty: 14 PATCH | Refills: 0 | Status: SHIPPED | OUTPATIENT
Start: 2025-03-14 | End: 2025-04-13

## 2025-03-14 RX ORDER — CYCLOBENZAPRINE HCL 10 MG
10 TABLET ORAL 3 TIMES DAILY PRN
Qty: 12 TABLET | Refills: 0 | Status: SHIPPED | OUTPATIENT
Start: 2025-03-14 | End: 2025-03-24

## 2025-03-14 ASSESSMENT — PAIN DESCRIPTION - LOCATION: LOCATION: CHEST

## 2025-03-14 ASSESSMENT — ENCOUNTER SYMPTOMS
ABDOMINAL PAIN: 0
COLOR CHANGE: 0
VOMITING: 0
SHORTNESS OF BREATH: 0

## 2025-03-14 ASSESSMENT — PAIN - FUNCTIONAL ASSESSMENT: PAIN_FUNCTIONAL_ASSESSMENT: 0-10

## 2025-03-14 ASSESSMENT — PAIN SCALES - GENERAL: PAINLEVEL_OUTOF10: 8
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
## 2025-07-01 ENCOUNTER — HOSPITAL ENCOUNTER (EMERGENCY)
Age: 45
Discharge: HOME | End: 2025-07-01
Attending: EMERGENCY MEDICINE
Payer: COMMERCIAL

## 2025-07-01 ENCOUNTER — APPOINTMENT (OUTPATIENT)
Dept: GENERAL RADIOLOGY | Age: 45
End: 2025-07-01
Payer: COMMERCIAL

## 2025-07-01 VITALS
WEIGHT: 129.41 LBS | RESPIRATION RATE: 16 BRPM | HEART RATE: 78 BPM | SYSTOLIC BLOOD PRESSURE: 130 MMHG | DIASTOLIC BLOOD PRESSURE: 84 MMHG | HEIGHT: 65 IN | OXYGEN SATURATION: 98 % | BODY MASS INDEX: 21.56 KG/M2 | TEMPERATURE: 99.3 F

## 2025-07-01 DIAGNOSIS — S49.90XA INJURY OF UPPER EXTREMITY, UNSPECIFIED LATERALITY, INITIAL ENCOUNTER: Primary | ICD-10-CM

## 2025-07-01 DIAGNOSIS — N30.00 ACUTE CYSTITIS WITHOUT HEMATURIA: ICD-10-CM

## 2025-07-01 LAB
BACTERIA URNS QL MICRO: ABNORMAL /HPF
BILIRUB UR QL STRIP.AUTO: NEGATIVE
CLARITY UR: ABNORMAL
COLOR UR: YELLOW
EKG ATRIAL RATE: 65 BPM
EKG DIAGNOSIS: NORMAL
EKG P AXIS: 85 DEGREES
EKG P-R INTERVAL: 138 MS
EKG Q-T INTERVAL: 420 MS
EKG QRS DURATION: 72 MS
EKG QTC CALCULATION (BAZETT): 436 MS
EKG R AXIS: 84 DEGREES
EKG T AXIS: 73 DEGREES
EKG VENTRICULAR RATE: 65 BPM
EPI CELLS #/AREA URNS AUTO: 2 /HPF (ref 0–5)
GLUCOSE UR STRIP.AUTO-MCNC: NEGATIVE MG/DL
HCG UR QL: NEGATIVE
HGB UR QL STRIP.AUTO: ABNORMAL
HYALINE CASTS #/AREA URNS AUTO: 1 /LPF (ref 0–8)
KETONES UR STRIP.AUTO-MCNC: NEGATIVE MG/DL
LEUKOCYTE ESTERASE UR QL STRIP.AUTO: ABNORMAL
NITRITE UR QL STRIP.AUTO: POSITIVE
PH UR STRIP.AUTO: 6.5 [PH] (ref 5–8)
PROT UR STRIP.AUTO-MCNC: 30 MG/DL
RBC CLUMPS #/AREA URNS AUTO: 10 /HPF (ref 0–4)
SP GR UR STRIP.AUTO: 1.01 (ref 1–1.03)
UA COMPLETE W REFLEX CULTURE PNL UR: YES
UA DIPSTICK W REFLEX MICRO PNL UR: YES
URN SPEC COLLECT METH UR: ABNORMAL
UROBILINOGEN UR STRIP-ACNC: 1 E.U./DL
WBC #/AREA URNS AUTO: 194 /HPF (ref 0–5)

## 2025-07-01 PROCEDURE — 93005 ELECTROCARDIOGRAM TRACING: CPT | Performed by: EMERGENCY MEDICINE

## 2025-07-01 PROCEDURE — 87077 CULTURE AEROBIC IDENTIFY: CPT

## 2025-07-01 PROCEDURE — 12001 RPR S/N/AX/GEN/TRNK 2.5CM/<: CPT

## 2025-07-01 PROCEDURE — 84703 CHORIONIC GONADOTROPIN ASSAY: CPT

## 2025-07-01 PROCEDURE — 73090 X-RAY EXAM OF FOREARM: CPT

## 2025-07-01 PROCEDURE — 71045 X-RAY EXAM CHEST 1 VIEW: CPT

## 2025-07-01 PROCEDURE — 73130 X-RAY EXAM OF HAND: CPT

## 2025-07-01 PROCEDURE — 90715 TDAP VACCINE 7 YRS/> IM: CPT | Performed by: EMERGENCY MEDICINE

## 2025-07-01 PROCEDURE — 90471 IMMUNIZATION ADMIN: CPT | Performed by: EMERGENCY MEDICINE

## 2025-07-01 PROCEDURE — 87186 SC STD MICRODIL/AGAR DIL: CPT

## 2025-07-01 PROCEDURE — 81001 URINALYSIS AUTO W/SCOPE: CPT

## 2025-07-01 PROCEDURE — 99285 EMERGENCY DEPT VISIT HI MDM: CPT

## 2025-07-01 PROCEDURE — 6370000000 HC RX 637 (ALT 250 FOR IP): Performed by: EMERGENCY MEDICINE

## 2025-07-01 PROCEDURE — 87086 URINE CULTURE/COLONY COUNT: CPT

## 2025-07-01 PROCEDURE — 6360000002 HC RX W HCPCS: Performed by: EMERGENCY MEDICINE

## 2025-07-01 PROCEDURE — 93010 ELECTROCARDIOGRAM REPORT: CPT | Performed by: INTERNAL MEDICINE

## 2025-07-01 RX ORDER — CEPHALEXIN 500 MG/1
500 CAPSULE ORAL ONCE
Status: COMPLETED | OUTPATIENT
Start: 2025-07-01 | End: 2025-07-01

## 2025-07-01 RX ORDER — LIDOCAINE HYDROCHLORIDE 10 MG/ML
5 INJECTION, SOLUTION EPIDURAL; INFILTRATION; INTRACAUDAL; PERINEURAL ONCE
Status: COMPLETED | OUTPATIENT
Start: 2025-07-01 | End: 2025-07-01

## 2025-07-01 RX ORDER — CEPHALEXIN 500 MG/1
500 CAPSULE ORAL 4 TIMES DAILY
Qty: 28 CAPSULE | Refills: 0 | Status: SHIPPED | OUTPATIENT
Start: 2025-07-01 | End: 2025-07-08

## 2025-07-01 RX ADMIN — TETANUS TOXOID, REDUCED DIPHTHERIA TOXOID AND ACELLULAR PERTUSSIS VACCINE, ADSORBED 0.5 ML: 5; 2.5; 8; 8; 2.5 SUSPENSION INTRAMUSCULAR at 04:53

## 2025-07-01 RX ADMIN — CEPHALEXIN 500 MG: 500 CAPSULE ORAL at 04:54

## 2025-07-01 RX ADMIN — LIDOCAINE HYDROCHLORIDE 5 ML: 10 INJECTION, SOLUTION EPIDURAL; INFILTRATION; INTRACAUDAL; PERINEURAL at 04:53

## 2025-07-01 ASSESSMENT — PAIN SCALES - GENERAL
PAINLEVEL_OUTOF10: 5
PAINLEVEL_OUTOF10: 10

## 2025-07-01 ASSESSMENT — PAIN - FUNCTIONAL ASSESSMENT
PAIN_FUNCTIONAL_ASSESSMENT: 0-10
PAIN_FUNCTIONAL_ASSESSMENT: 0-10
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
## 2025-07-03 ENCOUNTER — RESULTS FOLLOW-UP (OUTPATIENT)
Dept: EMERGENCY DEPT | Age: 45
End: 2025-07-03

## 2025-07-03 LAB
BACTERIA UR CULT: ABNORMAL
BACTERIA UR CULT: ABNORMAL
ORGANISM: ABNORMAL